# Patient Record
Sex: MALE | Race: WHITE | NOT HISPANIC OR LATINO | Employment: FULL TIME | URBAN - NONMETROPOLITAN AREA
[De-identification: names, ages, dates, MRNs, and addresses within clinical notes are randomized per-mention and may not be internally consistent; named-entity substitution may affect disease eponyms.]

---

## 2017-12-15 ENCOUNTER — HOSPITAL ENCOUNTER (EMERGENCY)
Facility: HOSPITAL | Age: 37
Discharge: HOME | End: 2017-12-15
Admitting: EMERGENCY MEDICINE

## 2017-12-15 ENCOUNTER — APPOINTMENT (EMERGENCY)
Dept: RADIOLOGY | Facility: HOSPITAL | Age: 37
End: 2017-12-15

## 2017-12-15 VITALS
HEART RATE: 98 BPM | HEIGHT: 74 IN | SYSTOLIC BLOOD PRESSURE: 135 MMHG | TEMPERATURE: 99.2 F | DIASTOLIC BLOOD PRESSURE: 93 MMHG | WEIGHT: 250 LBS | BODY MASS INDEX: 32.08 KG/M2 | RESPIRATION RATE: 18 BRPM | OXYGEN SATURATION: 98 %

## 2017-12-15 DIAGNOSIS — G89.29 CHRONIC SCAPULAR PAIN: Primary | ICD-10-CM

## 2017-12-15 DIAGNOSIS — M89.8X1 CHRONIC SCAPULAR PAIN: Primary | ICD-10-CM

## 2017-12-15 PROCEDURE — 99283 EMERGENCY DEPT VISIT LOW MDM: CPT

## 2017-12-15 PROCEDURE — 72050 X-RAY EXAM NECK SPINE 4/5VWS: CPT

## 2017-12-15 PROCEDURE — 72072 X-RAY EXAM THORAC SPINE 3VWS: CPT

## 2017-12-15 RX ORDER — BUPIVACAINE HYDROCHLORIDE 5 MG/ML
10 INJECTION, SOLUTION EPIDURAL; INTRACAUDAL ONCE
Status: COMPLETED | OUTPATIENT
Start: 2017-12-15 | End: 2017-12-15

## 2017-12-15 RX ORDER — LIDOCAINE 50 MG/G
1 PATCH TOPICAL DAILY
Qty: 30 PATCH | Refills: 0 | Status: SHIPPED | OUTPATIENT
Start: 2017-12-15 | End: 2018-08-27

## 2017-12-15 RX ORDER — CYCLOBENZAPRINE HCL 10 MG
10 TABLET ORAL 3 TIMES DAILY PRN
Qty: 30 TABLET | Refills: 0 | Status: SHIPPED | OUTPATIENT
Start: 2017-12-15 | End: 2018-08-27

## 2017-12-15 RX ORDER — IBUPROFEN 200 MG
400 TABLET ORAL EVERY 6 HOURS PRN
COMMUNITY

## 2017-12-15 RX ADMIN — BUPIVACAINE HYDROCHLORIDE 10 ML: 5 INJECTION, SOLUTION EPIDURAL; INTRACAUDAL at 18:29

## 2017-12-15 NOTE — ED PROVIDER NOTES
History  Chief Complaint   Patient presents with    Shoulder Pain     left shoulder blade pain for months  Became worse 1 month ago  Patient presents to the emergency department today for longstanding left scapular pain  Patient states he has been ongoing for 4 months  He denies chest pain or shortness of breath  Patient states he has seen a chiropracty, orthopedics  He has had plain films of the shoulder as well as an MRI of the shoulder which did not show any abnormalities  Has not undergone physical therapy  He does not believe he has had any imaging of the back  Prior to Admission Medications   Prescriptions Last Dose Informant Patient Reported? Taking?   ibuprofen (MOTRIN) 200 mg tablet   Yes Yes   Sig: Take by mouth every 6 (six) hours as needed for mild pain      Facility-Administered Medications: None       Past Medical History:   Diagnosis Date    Collapsed lung 8 years ago    chest tube placement       History reviewed  No pertinent surgical history  History reviewed  No pertinent family history  I have reviewed and agree with the history as documented  Social History   Substance Use Topics    Smoking status: Current Every Day Smoker     Packs/day: 1 00     Types: Cigarettes    Smokeless tobacco: Never Used    Alcohol use No        Review of Systems   Constitutional: Negative  HENT: Negative  Eyes: Negative  Respiratory: Negative  Cardiovascular: Negative  Gastrointestinal: Negative  Endocrine: Negative  Genitourinary: Negative  Musculoskeletal: Positive for back pain  Negative for arthralgias, gait problem, joint swelling, neck pain and neck stiffness  L medial scapular tenderness   Skin: Negative  Allergic/Immunologic: Negative  Neurological: Negative  Hematological: Negative  Psychiatric/Behavioral: Negative  All other systems reviewed and are negative        Physical Exam  ED Triage Vitals   Temperature Pulse Respirations Blood Pressure SpO2   12/15/17 1656 12/15/17 1658 12/15/17 1658 12/15/17 1658 12/15/17 1658   99 2 °F (37 3 °C) 98 18 135/93 98 %      Temp Source Heart Rate Source Patient Position - Orthostatic VS BP Location FiO2 (%)   12/15/17 1656 12/15/17 1658 12/15/17 1658 12/15/17 1658 --   Temporal Monitor Sitting Right arm       Pain Score       12/15/17 1656       8           Orthostatic Vital Signs  Vitals:    12/15/17 1658   BP: 135/93   Pulse: 98   Patient Position - Orthostatic VS: Sitting       Physical Exam   Constitutional: He is oriented to person, place, and time  He appears well-developed and well-nourished  No distress  HENT:   Head: Normocephalic and atraumatic  Eyes: Pupils are equal, round, and reactive to light  Neck: Normal range of motion  Cardiovascular: Normal rate  Pulmonary/Chest: Effort normal    Abdominal: Soft  Musculoskeletal: He exhibits tenderness  He exhibits no edema or deformity  Left medial scapular point tenderness  No rash/swelling  Neurological: He is alert and oriented to person, place, and time  Skin: Capillary refill takes less than 2 seconds  He is not diaphoretic  Psychiatric: He has a normal mood and affect  Vitals reviewed  ED Medications  Medications   bupivacaine (PF) (MARCAINE) 0 5 % injection 10 mL (10 mL Injection Given by Other 12/15/17 1829)       Diagnostic Studies  Results Reviewed     None                 XR spine thoracic 3 views   Final Result by Romel Moreno MD (12/15 1915)      Minimal anterior vertebral body height loss at T11  Unless there is midline point tenderness near the thoracolumbar junction, height loss is likely chronic and discogenic  No other acute osseous findings  Workstation performed: [id number]         XR spine cervical complete 4 or 5 vw non injury   Final Result by Romel Moreno MD (12/15 1912)      1  Straightening of the cervical lordosis may suggest muscle spasm        2   No acute findings or significant degenerative changes         Workstation performed: [id number]                    Procedures  Procedures       Phone Contacts  ED Phone Contact    ED Course  ED Course as of Dec 15 1934   Fri Dec 15, 2017   1833 10 cc of 0 5% bupivacaine was injected into the point of maximal tenderness just medial to the scapula left side  Area was aspirated and no evidence of venous puncture was noticed  Patient tolerated the procedure well  Skin was prepped both pre and post procedure and dressing placed    1844 Patient was re-evaluated just prior to discharge  Patient states he is experiencing much relief from the injection                                [facility]  CritCare Time    Disposition  Final diagnoses:   Chronic scapular pain     Time reflects when diagnosis was documented in both MDM as applicable and the Disposition within this note     Time User Action Codes Description Comment    12/15/2017  6:34 PM Roxana Hair Add [R31 0X5,  G89 29] Chronic scapular pain       ED Disposition     ED Disposition Condition Comment    Discharge  Kye Riojas discharge to home/self care      Condition at discharge: Good        Follow-up Information     Follow up With Specialties Details Why Apolinar Cardoza DO Anesthesiology, Pain Medicine Schedule an appointment as soon as possible for a visit  [address] [address]  [phone]          Discharge Medication List as of 12/15/2017  6:39 PM      START taking these medications    Details   cyclobenzaprine (FLEXERIL) 10 mg tablet Take 1 tablet by mouth 3 (three) times a day as needed for muscle spasms, Starting Fri 12/15/2017, Print      lidocaine (LIDODERM) 5 % Place 1 patch on the skin daily Remove & Discard patch within 12 hours or as directed by MD, Starting Fri 12/15/2017, Print         CONTINUE these medications which have NOT CHANGED    Details   ibuprofen (MOTRIN) 200 mg tablet Take by mouth every 6 (six) hours as needed for mild pain, Historical Med           No discharge procedures on file      ED Provider  Electronically Signed by           Sapphire Gastelum PA-C  12/15/17 [place] [address] Aubree Bobby PA-C  12/15/17 1930

## 2017-12-15 NOTE — DISCHARGE INSTRUCTIONS
Chronic Pain   WHAT YOU NEED TO KNOW:   Chronic pain is pain that does not get better for 3 months or longer  Chronic pain may hurt all the time, or come and go  DISCHARGE INSTRUCTIONS:   Call 911 or have someone call 911 for any of the following:   · You are breathing slower than normal, or you have trouble breathing  · You cannot be awakened  · You have a seizure  Seek care immediately if:   · Your heart is beating slower than normal     · Your heart feels like it is jumping or fluttering  · You cannot think clearly  Contact your healthcare provider if:   · You have side effects from prescription pain medicine, such as itching, nausea, or vomiting  · You have trouble sleeping  · Your pain gets worse, even after you take medicine  · You don't think the medicine is working  · You have questions or concerns about your condition or care  Medicines: You may need any of the following:  · Acetaminophen  decreases pain  It is available without a doctor's order  Ask how much to take and how often to take it  Follow directions  Read the labels of all other medicines you are using to see if they also contain acetaminophen, or ask your doctor or pharmacist  Acetaminophen can cause liver damage if not taken correctly  Do not use more than 4 grams (4,000 milligrams) total of acetaminophen in one day  · NSAIDs , such as ibuprofen, help decrease swelling, pain, and fever  This medicine is available with or without a doctor's order  NSAIDs can cause stomach bleeding or kidney problems in certain people  If you take blood thinner medicine, always ask your healthcare provider if NSAIDs are safe for you  Always read the medicine label and follow directions  · Prescription pain medicine  called narcotics or opioids may be given  Ask your healthcare provider how to take this medicine safely  · Anesthetics  can be rubbed on your skin or injected into a nerve or muscle to numb an area      · Other medicines  may reduce pain, anxiety, muscle tension, or swelling  · Take your medicine as directed  Contact your healthcare provider if you think your medicine is not helping or if you have side effects  Tell him of her if you are allergic to any medicine  Keep a list of the medicines, vitamins, and herbs you take  Include the amounts, and when and why you take them  Bring the list or the pill bottles to follow-up visits  Carry your medicine list with you in case of an emergency  Manage your chronic pain:   · Apply heat  on the area in pain for 20 to 30 minutes every 2 hours for as many days as directed  Heat helps decrease pain and muscle spasms  · Apply ice  on the part of your body that hurts for 15 to 20 minutes every hour or as directed  Use an ice pack, or put crushed ice in a plastic bag  Cover it with a towel  Ice decreases pain and swelling, and helps prevent tissue damage  · Go to physical therapy as directed  A physical therapist teaches you exercises to help improve movement and strength, and to decrease pain  · Exercise for 30 minutes, 3 times a week  Regular physical activity can help decrease pain and improve your quality of life  Ask your healthcare provider about the best exercise plan for your type of pain  · Get enough sleep  Create a relaxing bedtime routine  Go to sleep and wake up at the same time every day  Avoid caffeine in the afternoon  · Talk with a counselor or therapist   A type of counseling called cognitive behavioral therapy (CBT) can help your chronic pain by changing the way you think about it  CBT can also improve your mood, sleep, and ability to move  What you must know if you take narcotic pain medicine:   · You may need to take a bowel movement softener  The most common side effect of prescription pain medicine is constipation  Bowel movement softeners are available over the counter  · Do not mix prescription pain medicines    This can cause an overdose of medicine, which can become life-threatening  Read labels  Make sure you know the ingredients in all of your medicines  · Do not drink alcohol  when you take prescription pain medicine  It is not safe to mix narcotics or opioids with alcohol or illegal drugs  · Prescription pain medicine may impair your ability to drive or work safely  They may also cause dizziness and increase your risk for falling  · Store prescription pain medicine in a safe location at home  Keep your medicine away from children and other people  Never share your medicine with anyone  Follow up with your healthcare provider as directed: You may be referred to a pain specialist  Write down your questions so you remember to ask them during your visits  © 2017 [address] Information is for End User's use only and may not be sold, redistributed or otherwise used for commercial purposes  All illustrations and images included in CareNotes® are the copyrighted property of [employer]  or [address]  The above information is an  only  It is not intended as medical advice for individual conditions or treatments  Talk to your doctor, nurse or pharmacist before following any medical regimen to see if it is safe and effective for you

## 2018-08-27 ENCOUNTER — HOSPITAL ENCOUNTER (EMERGENCY)
Facility: HOSPITAL | Age: 38
Discharge: LEFT BEFORE DISCHARGE | End: 2018-08-27
Attending: FAMILY MEDICINE | Admitting: FAMILY MEDICINE

## 2018-08-27 VITALS
BODY MASS INDEX: 29.65 KG/M2 | DIASTOLIC BLOOD PRESSURE: 89 MMHG | HEIGHT: 74 IN | HEART RATE: 100 BPM | RESPIRATION RATE: 16 BRPM | SYSTOLIC BLOOD PRESSURE: 138 MMHG | TEMPERATURE: 98.5 F | WEIGHT: 231 LBS | OXYGEN SATURATION: 98 %

## 2018-08-27 DIAGNOSIS — M25.522 LEFT ELBOW PAIN: Primary | ICD-10-CM

## 2018-08-27 PROCEDURE — 99283 EMERGENCY DEPT VISIT LOW MDM: CPT

## 2018-08-27 RX ORDER — KETOROLAC TROMETHAMINE 30 MG/ML
60 INJECTION, SOLUTION INTRAMUSCULAR; INTRAVENOUS ONCE
Status: DISCONTINUED | OUTPATIENT
Start: 2018-08-27 | End: 2018-08-27 | Stop reason: HOSPADM

## 2018-08-27 RX ORDER — METHYLPREDNISOLONE 4 MG/1
TABLET ORAL
Qty: 21 TABLET | Refills: 0 | Status: SHIPPED | OUTPATIENT
Start: 2018-08-27

## 2018-08-27 RX ORDER — IBUPROFEN 800 MG/1
800 TABLET ORAL EVERY 8 HOURS PRN
Qty: 21 TABLET | Refills: 0 | Status: SHIPPED | OUTPATIENT
Start: 2018-08-27

## 2018-08-27 NOTE — DISCHARGE INSTRUCTIONS
Arm Pain   WHAT YOU NEED TO KNOW:   Your arm pain may be caused by a number of conditions  Examples include arthritis, nerve problems, or an awkward position while you sleep  X-rays did not show a broken bone in your arm or wrist  Arm pain may be a sign of a serious condition that needs immediate care, such as a heart attack  DISCHARGE INSTRUCTIONS:   Call 911 for any of the following: You have any of the following signs of a heart attack:   · Squeezing, pressure, or pain in your chest that lasts longer than 5 minutes or returns    · Discomfort or pain in your back, neck, jaw, stomach, or arm     · Trouble breathing or a fast, fluttery heartbeat    · Nausea or vomiting    · Lightheadedness or a sudden cold sweat, especially with chest pain or trouble breathing  Return to the emergency department if:   · You have severe pain, or pain that spreads from your arm to other areas  · You have swelling, tingling, or numbness in your hand or fingers, or the skin turns blue  · You cannot move your arm  Contact your healthcare provider if:   · You have questions or concerns about your condition or care  Medicines: You may need any of the following:  · Prescription pain medicine  may be given  Ask how to take this medicine safely  · NSAIDs , such as ibuprofen, help decrease swelling, pain, and fever  This medicine is available with or without a doctor's order  NSAIDs can cause stomach bleeding or kidney problems in certain people  If you take blood thinner medicine, always ask your healthcare provider if NSAIDs are safe for you  Always read the medicine label and follow directions  · Take your medicine as directed  Contact your healthcare provider if you think your medicine is not helping or if you have side effects  Tell him or her if you are allergic to any medicine  Keep a list of the medicines, vitamins, and herbs you take  Include the amounts, and when and why you take them   Bring the list or the pill bottles to follow-up visits  Carry your medicine list with you in case of an emergency  Self-care:   · Rest your arm as directed  A sling may be used to keep your arm from moving while it heals  · Apply ice as directed  Ice helps decrease pain and swelling  Ice may also help prevent tissue damage  Use an ice pack, or put crushed ice in a plastic bag  Cover it with a towel  Apply it to your arm for 20 minutes every few hours, or as directed  Ask how many times to apply ice each day, and for how many days  · Elevate your arm above the level of your heart as often as you can  This will help decrease swelling and pain  Prop your arm on pillows or blankets to keep the area elevated comfortably  · Adjust your position if you work in front of a computer  You may need arm or wrist supports or change the height of your chair  · Keep a pain record  Write down when your pain happens and how severe it is  Include any other symptoms you have with your pain  A record will help you keep track of pain cycles  Bring the record with you to your follow-up visits  It may also help your healthcare provider find out what is causing your pain  Follow up with your healthcare provider as directed: You may need physical therapy  You may need to see an orthopedic specialist  Write down your questions so you remember to ask them during your visits  © 2017 [address] Information is for End User's use only and may not be sold, redistributed or otherwise used for commercial purposes  All illustrations and images included in CareNotes® are the copyrighted property of [employer]  or [employer]  The above information is an  only  It is not intended as medical advice for individual conditions or treatments  Talk to your doctor, nurse or pharmacist before following any medical regimen to see if it is safe and effective for you

## 2018-08-27 NOTE — ED PROVIDER NOTES
History  Chief Complaint   Patient presents with    Elbow Pain     left; denies injury or trauma       History provided by:  Patient   used: No    Elbow Pain   Location:  Elbow  Elbow location:  L elbow  Injury: no    Pain details:     Quality:  Sharp    Radiates to:  L forearm    Severity:  Moderate    Onset quality:  Gradual    Duration:  3 days    Timing:  Constant    Progression:  Waxing and waning  Handedness:  Right-handed  Dislocation: no    Foreign body present:  No foreign bodies  Tetanus status:  Up to date  Prior injury to area:  No  Relieved by:  None tried  Worsened by: Movement  Ineffective treatments:  None tried  Associated symptoms: numbness    Associated symptoms: no back pain, no decreased range of motion, no fatigue, no fever, no muscle weakness, no neck pain, no swelling and no tingling    Risk factors: no recent illness        Prior to Admission Medications   Prescriptions Last Dose Informant Patient Reported? Taking?   ibuprofen (MOTRIN) 200 mg tablet   Yes No   Sig: Take 400 mg by mouth every 6 (six) hours as needed for mild pain        Facility-Administered Medications: None       Past Medical History:   Diagnosis Date    Collapsed lung 8 years ago    chest tube placement    Collapsed lung     Renal disorder        History reviewed  No pertinent surgical history  History reviewed  No pertinent family history  I have reviewed and agree with the history as documented  Social History   Substance Use Topics    Smoking status: Current Every Day Smoker     Packs/day: 1 00     Types: Cigarettes    Smokeless tobacco: Never Used    Alcohol use Yes      Comment: occasional        Review of Systems   Constitutional: Negative for chills, fatigue and fever  HENT: Negative for rhinorrhea and sore throat  Eyes: Negative for visual disturbance  Respiratory: Negative for cough and shortness of breath  Cardiovascular: Negative for chest pain and leg swelling  Gastrointestinal: Negative for abdominal pain, diarrhea, nausea and vomiting  Genitourinary: Negative for dysuria  Musculoskeletal: Negative for back pain, myalgias and neck pain  Left elbow pain    Skin: Negative for rash  Neurological: Negative for dizziness and headaches  Psychiatric/Behavioral: Negative for confusion  All other systems reviewed and are negative  Physical Exam  Physical Exam   Constitutional: He is oriented to person, place, and time  He appears well-developed and well-nourished  No distress  HENT:   Head: Normocephalic and atraumatic  Neck: Normal range of motion  Neck supple  Cardiovascular: Normal rate and regular rhythm  Pulmonary/Chest: Effort normal and breath sounds normal  No respiratory distress  He has no wheezes  Abdominal: He exhibits no distension  There is no tenderness  Musculoskeletal: He exhibits tenderness (Left elbow tenderness range of motion is intact no redness or swelling was noted  Good sensation radial pulses palpable)  Lymphadenopathy:     He has no cervical adenopathy  Neurological: He is alert and oriented to person, place, and time  Skin: Skin is warm and dry  Capillary refill takes less than 2 seconds  He is not diaphoretic  Psychiatric: He has a normal mood and affect  His behavior is normal    Nursing note and vitals reviewed        Vital Signs  ED Triage Vitals [08/27/18 1800]   Temperature Pulse Respirations Blood Pressure SpO2   98 5 °F (36 9 °C) 100 16 138/89 98 %      Temp Source Heart Rate Source Patient Position - Orthostatic VS BP Location FiO2 (%)   Temporal Monitor Sitting Right arm --      Pain Score       8           Vitals:    08/27/18 1800   BP: 138/89   Pulse: 100   Patient Position - Orthostatic VS: Sitting       Visual Acuity      ED Medications  Medications   ketorolac (TORADOL) injection 60 mg (not administered)   dexamethasone (DECADRON) injection 10 mg (not administered)       Diagnostic Studies  Results Reviewed     None                 No orders to display              Procedures  Procedures       Phone Contacts  ED Phone Contact    ED Course                 1830 Pt eloped              MDM  CritCare Time    Disposition  Final diagnoses:   Left elbow pain     Time reflects when diagnosis was documented in both MDM as applicable and the Disposition within this note     Time User Action Codes Description Comment    8/27/2018  6:08 PM Octavio Morrison [M25 522] Left elbow pain       ED Disposition     None      Follow-up Information     Follow up With Specialties Details Why Contact Info    Clair Price PA-C Family Medicine, Physician Assistant In 2 days If symptoms worsen [address] [place] [address]  [address] [phone]            Patient's Medications   Discharge Prescriptions    IBUPROFEN (MOTRIN) 800 MG TABLET    Take 1 tablet (800 mg total) by mouth every 8 (eight) hours as needed for mild pain       Start Date: 8/27/2018 End Date: --       Order Dose: 800 mg       Quantity: 21 tablet    Refills: 0    METHYLPREDNISOLONE 4 MG TBPK    Use as directed on package       Start Date: 8/27/2018 End Date: --       Order Dose: --       Quantity: 21 tablet    Refills: 0     No discharge procedures on file      ED Provider  Electronically Signed by           Lo Chan MD  08/27/18 6504 David Key MD  08/27/18 2844

## 2018-08-27 NOTE — ED NOTES
Pt not in treatment room when this RN entered room  Pt labels found laying on bed  Per ER registration staff they witnessed pt exit through ER waiting area approximately 5-10 minutes ago       Morteza Wilson RN  08/27/18 9026

## 2019-09-11 ENCOUNTER — TELEPHONE (OUTPATIENT)
Dept: FAMILY MEDICINE CLINIC | Facility: CLINIC | Age: 39
End: 2019-09-11

## 2020-02-03 NOTE — TELEPHONE ENCOUNTER
02/03/20 3:08 PM     Thank you for your request  Your request has been received, reviewed, and the patient chart updated  The PCP has successfully been removed with a patient attribution note  This message will now be completed      Thank you  Christopher Jones

## 2021-10-07 ENCOUNTER — HOSPITAL ENCOUNTER (EMERGENCY)
Facility: HOSPITAL | Age: 41
Discharge: HOME | End: 2021-10-07
Attending: EMERGENCY MEDICINE | Admitting: EMERGENCY MEDICINE

## 2021-10-07 VITALS
TEMPERATURE: 98.2 F | HEIGHT: 74 IN | OXYGEN SATURATION: 98 % | RESPIRATION RATE: 20 BRPM | DIASTOLIC BLOOD PRESSURE: 95 MMHG | BODY MASS INDEX: 29.65 KG/M2 | HEART RATE: 90 BPM | WEIGHT: 231 LBS | SYSTOLIC BLOOD PRESSURE: 150 MMHG

## 2021-10-07 DIAGNOSIS — H66.90 OTITIS MEDIA: Primary | ICD-10-CM

## 2021-10-07 PROCEDURE — 99284 EMERGENCY DEPT VISIT MOD MDM: CPT | Performed by: EMERGENCY MEDICINE

## 2021-10-07 PROCEDURE — 99282 EMERGENCY DEPT VISIT SF MDM: CPT

## 2021-10-07 RX ORDER — AMOXICILLIN 500 MG/1
500 CAPSULE ORAL 3 TIMES DAILY
Qty: 30 CAPSULE | Refills: 0 | Status: SHIPPED | OUTPATIENT
Start: 2021-10-07 | End: 2021-10-17

## 2021-10-07 RX ORDER — AMOXICILLIN 500 MG/1
500 CAPSULE ORAL ONCE
Status: COMPLETED | OUTPATIENT
Start: 2021-10-07 | End: 2021-10-07

## 2021-10-07 RX ADMIN — AMOXICILLIN 500 MG: 500 CAPSULE ORAL at 16:58

## 2022-01-28 ENCOUNTER — OFFICE VISIT (OUTPATIENT)
Dept: URGENT CARE | Facility: CLINIC | Age: 42
End: 2022-01-28

## 2022-01-28 VITALS — TEMPERATURE: 98.1 F | OXYGEN SATURATION: 98 % | HEART RATE: 88 BPM | RESPIRATION RATE: 20 BRPM

## 2022-01-28 DIAGNOSIS — J20.8 ACUTE BRONCHITIS DUE TO OTHER SPECIFIED ORGANISMS: Primary | ICD-10-CM

## 2022-01-28 DIAGNOSIS — R05.1 ACUTE COUGH: ICD-10-CM

## 2022-01-28 DIAGNOSIS — Z72.0 TOBACCO USE: ICD-10-CM

## 2022-01-28 PROCEDURE — U0003 INFECTIOUS AGENT DETECTION BY NUCLEIC ACID (DNA OR RNA); SEVERE ACUTE RESPIRATORY SYNDROME CORONAVIRUS 2 (SARS-COV-2) (CORONAVIRUS DISEASE [COVID-19]), AMPLIFIED PROBE TECHNIQUE, MAKING USE OF HIGH THROUGHPUT TECHNOLOGIES AS DESCRIBED BY CMS-2020-01-R: HCPCS | Performed by: NURSE PRACTITIONER

## 2022-01-28 PROCEDURE — 99213 OFFICE O/P EST LOW 20 MIN: CPT | Performed by: NURSE PRACTITIONER

## 2022-01-28 PROCEDURE — U0005 INFEC AGEN DETEC AMPLI PROBE: HCPCS | Performed by: NURSE PRACTITIONER

## 2022-01-28 RX ORDER — AZITHROMYCIN 250 MG/1
TABLET, FILM COATED ORAL
Qty: 6 TABLET | Refills: 0 | Status: SHIPPED | OUTPATIENT
Start: 2022-01-28 | End: 2022-02-01

## 2022-01-28 NOTE — LETTER
January 28, 2022     Patient: Colt Jacobson   YOB: 1980   Date of Visit: 1/28/2022       To Whom It May Concern: It is my medical opinion that Steffany Trejo may return to work on 1/31/2022  If you have any questions or concerns, please don't hesitate to call  Sincerely,        JESSE Kraus    CC: Chris Bryson

## 2022-01-28 NOTE — PATIENT INSTRUCTIONS
You hare to take the azithromycin as prescribed  Take robitussin for cough  You are to stop smoking  You have a covid test pending  You are to download [employer] for the results in 24-72 hours, you will be notified if testing +  You are to follow up with a PCP  Go to the ED if symptoms worsen    Acute Bronchitis   WHAT YOU NEED TO KNOW:   Acute bronchitis is swelling and irritation in your lungs  It is usually caused by a virus and most often happens in the winter  Bronchitis may also be caused by bacteria or by a chemical irritant, such as smoke  DISCHARGE INSTRUCTIONS:   Return to the emergency department if:   · You cough up blood  · Your lips or fingernails turn blue  · You feel like you are not getting enough air when you breathe  Call your doctor if:   · Your symptoms do not go away or get worse, even after treatment  · Your cough does not get better within 4 weeks  · You have questions or concerns about your condition or care  Medicines: You may  need any of the following:  · Cough suppressants  decrease your urge to cough  · Decongestants  help loosen mucus in your lungs and make it easier to cough up  This can help you breathe easier  · Inhalers  may be given  Your healthcare provider may give you one or more inhalers to help you breathe easier and cough less  An inhaler gives your medicine to open your airways  Ask your healthcare provider to show you how to use your inhaler correctly  · Antibiotics  may be given for up to 5 days if your bronchitis is caused by bacteria  · Acetaminophen  decreases pain and fever  It is available without a doctor's order  Ask how much to take and how often to take it  Follow directions  Read the labels of all other medicines you are using to see if they also contain acetaminophen, or ask your doctor or pharmacist  Acetaminophen can cause liver damage if not taken correctly   Do not use more than 4 grams (4,000 milligrams) total of acetaminophen in one day  · NSAIDs  help decrease swelling and pain or fever  This medicine is available with or without a doctor's order  NSAIDs can cause stomach bleeding or kidney problems in certain people  If you take blood thinner medicine, always ask your healthcare provider if NSAIDs are safe for you  Always read the medicine label and follow directions  · Take your medicine as directed  Contact your healthcare provider if you think your medicine is not helping or if you have side effects  Tell him of her if you are allergic to any medicine  Keep a list of the medicines, vitamins, and herbs you take  Include the amounts, and when and why you take them  Bring the list or the pill bottles to follow-up visits  Carry your medicine list with you in case of an emergency  Self-care:   · Drink liquids as directed  You may need to drink more liquids than usual to stay hydrated  Ask how much liquid to drink each day and which liquids are best for you  · Use a cool mist humidifier  to increase air moisture in your home  This may make it easier for you to breathe and help decrease your cough  · Get more rest   Rest helps your body to heal  Slowly start to do more each day  Rest when you feel it is needed  · Avoid irritants in the air  Avoid chemicals, fumes, and dust  Wear a face mask if you must work around dust or fumes  Stay inside on days when air pollution levels are high  If you have allergies, stay inside when pollen counts are high  Do not use aerosol products, such as spray-on deodorant, bug spray, and hair spray  · Do not smoke or be around others who are smoking  Nicotine and other chemicals in cigarettes and cigars can cause lung damage  Ask your healthcare provider for information if you currently smoke and need help to quit  E-cigarettes or smokeless tobacco still contain nicotine  Talk to your healthcare provider before you use these products      Prevent acute bronchitis: · Ask about vaccines you may need  Get a flu vaccine each year as soon as recommended, usually in September or October  Ask your healthcare provider if you should also get a pneumonia or COVID-19 vaccine  Your healthcare provider can tell you if you should also get other vaccines, and when to get them  · Prevent the spread of germs  You can decrease your risk for acute bronchitis and other illnesses by doing the following:     ? Wash your hands often with soap and water  Carry germ-killing hand lotion or gel with you  You can use the lotion or gel to clean your hands when soap and water are not available  ? Do not touch your eyes, nose, or mouth unless you have washed your hands first     ? Always cover your mouth when you cough to prevent the spread of germs  It is best to cough into a tissue or your shirt sleeve instead of into your hand  Ask those around you to cover their mouths when they cough  ? Try to avoid people who have a cold or the flu  If you are sick, stay away from others as much as possible  Follow up with your doctor as directed:  Write down questions you have so you will remember to ask them during your follow-up visits  © Copyright [employer] 2021 Information is for End User's use only and may not be sold, redistributed or otherwise used for commercial purposes  All illustrations and images included in CareNotes® are the copyrighted property of [employer] M [employer]  or [employer]  The above information is an  only  It is not intended as medical advice for individual conditions or treatments  Talk to your doctor, nurse or pharmacist before following any medical regimen to see if it is safe and effective for you  COVID-19 (Coronavirus Disease 2019)   WHAT YOU NEED TO KNOW:   Coronavirus disease 2019 (COVID-19) is the disease caused by a coronavirus first discovered in December 2019   Coronaviruses generally cause upper respiratory (nose, throat, and lung) infections, such as a cold  The new virus spreads quickly and easily  The virus can be spread starting 2 days before symptoms even begin  The virus has also changed into several new forms (called variants) since it was discovered  The variants may be more contagious (easily spread) than the original form  Some may also cause more severe illness than others  It is important to follow local, national, and worldwide measures to protect yourself and others from infection  DISCHARGE INSTRUCTIONS:   If you think you or someone you know may be infected:  Do the following to protect others:  · If emergency care is needed,  tell the  about the possible infection, or call ahead and tell the emergency department  · Call a healthcare provider  for instructions if symptoms are mild  Anyone who may be infected should not  arrive without calling first  The provider will need to protect staff members and other patients  · The person who may be infected needs to wear a face covering  while getting medical care  This will help lower the risk of infecting others  Coverings are not used for anyone who is younger than 2 years, has breathing problems, or cannot remove it  The provider can give you instructions for anyone who cannot wear a covering  Call your local emergency number (911 in the [address] [place] [address],[address]) or an emergency department if:   · You have trouble breathing or shortness of breath at rest     · You have chest pain or pressure that lasts longer than 5 minutes  · You become confused or hard to wake  · Your lips or face are blue  · You have a fever of 104°F (40°C) or higher  Call your doctor if:   · You do not  have symptoms of COVID-19 but had close physical contact within 14 days with someone who tested positive  · You have questions or concerns about your condition or care  Medicines:   You may need any of the following for mild symptoms:  · Decongestants  help reduce nasal congestion and help you breathe more easily  If you take decongestant pills, they may make you feel restless or cause problems with your sleep  Do not use decongestant sprays for more than a few days  · Cough suppressants  help reduce coughing  Ask your healthcare provider which type of cough medicine is best for you  · Acetaminophen  decreases pain and fever  It is available without a doctor's order  Ask how much to take and how often to take it  Follow directions  Read the labels of all other medicines you are using to see if they also contain acetaminophen, or ask your doctor or pharmacist  Acetaminophen can cause liver damage if not taken correctly  Do not use more than 4 grams (4,000 milligrams) total of acetaminophen in one day  · NSAIDs , such as ibuprofen, help decrease swelling, pain, and fever  NSAIDs can cause stomach bleeding or kidney problems in certain people  If you take blood thinner medicine, always ask your healthcare provider if NSAIDs are safe for you  Always read the medicine label and follow directions  · Take your medicine as directed  Contact your healthcare provider if you think your medicine is not helping or if you have side effects  Tell him or her if you are allergic to any medicine  Keep a list of the medicines, vitamins, and herbs you take  Include the amounts, and when and why you take them  Bring the list or the pill bottles to follow-up visits  Carry your medicine list with you in case of an emergency  What you need to know about COVID-19 vaccines:  Get a vaccine even if you already had COVID-19  · COVID-19 vaccines are given as a shot in 1 or 2 doses  Some vaccines have emergency use authorization (EUA)  An EUA means the vaccine is not approved but is given because the benefits outweigh the risks  A 2-dose vaccine is fully approved for use in those 16 years or older  This vaccine also has an EUA for adolescents 12 to 15 years   Your healthcare provider can help you understand the benefits and risks  · A third dose is recommended for adults with a weakened immune system who get a 2-dose vaccine  The third dose is given at least 28 days after the second  · Even after you get the vaccine, continue social distancing and other measures  Experts are still learning how well the vaccines work to prevent infection, transmission, and severe illness  Although rare, you can become infected after you get the vaccine  You may also be able to pass the virus to others without knowing you are infected  · After you get the vaccine, check local, national, and international travel rules  Check to see if you need to be tested before you travel  You may also need to quarantine after you return  Some countries require proof of a negative test before you leave  You should also be tested 3 to 5 days after you return from another country  How the 2019 coronavirus spreads: The following are ways the virus is thought to spread, but more information may be coming:  · Droplets are the main way all coronaviruses spread  The virus travels in droplets that form when a person talks, coughs, or sneezes  The droplets can also float in the air for minutes or hours  Infection happens when you breathe in the droplets or get them in your eyes or nose  Close personal contact with an infected person increases your risk for infection  This means being within 6 feet (2 meters) of the person for at least 15 minutes over 24 hours  · Person-to-person contact can spread the virus  For example, a person with the virus on his or her hands can spread it by shaking hands with someone  · The virus can stay on objects and surfaces for a short time  You may become infected by touching the object or surface and then touching your eyes or mouth  · An infected animal may be able to infect a person who touches it  This may happen at live markets or on a farm      Help lower the risk for COVID-19:  The best way to prevent infection is to avoid anyone who is infected, but this can be hard to do  An infected person can spread the virus before signs or symptoms begin, or even if signs or symptoms never develop  The following can help lower the risk for infection:      · Wash your hands often throughout the day  Use soap and water  Rub your soapy hands together, lacing your fingers, for at least 20 seconds  Rinse with warm, running water  Dry your hands with a clean towel or paper towel  Use hand  that contains alcohol if soap and water are not available  Teach children how to wash their hands and use hand   · Cover sneezes and coughs  Turn your face away and cover your mouth and nose with a tissue  Throw the tissue away  Use the bend of your arm if a tissue is not available  Then wash your hands well with soap and water or use hand   Teach children how to cover a cough or sneeze  · Wear a face covering (mask) around anyone who does not live in your home  Use a cloth covering with at least 2 layers  You can also create layers by putting a cloth covering over a disposable non-medical mask  Cover your mouth and your nose  The covering should fit snugly against the bridge of your nose  Securely fasten it under your chin and on the sides of your face  Do not  wear a plastic face shield instead of a covering  Continue social distancing and washing your hands often  A face covering is not a substitute for social distancing safety measures  · Follow worldwide, national, and local social distancing guidelines  Keep at least 6 feet (2 meters) between you and others  Also keep this distance from anyone who comes to your home, such as someone making a delivery  Wear a face covering while you are around others  You will need to wear a covering in restaurants, stores, and other public buildings  You will also need a covering on mass transit, such as a bus, subway, or airplane   Remember to use a covering made from thick material or wear 2 coverings together  · Make a habit of not touching your face  If you get the virus on your hands, you can transfer it to your eyes, nose, or mouth and become infected  You can also transfer it to objects, surfaces, or people  Do not touch your eyes, nose, or mouth without washing your hands first     · Clean and disinfect high-touch surfaces and objects often  Use disinfecting wipes, or make a solution of 4 teaspoons of bleach in 1 quart (4 cups) of water  Clean and disinfect even if you think no one living in or coming to your home is infected with the virus  · Ask about other vaccines you may need  Get the influenza (flu) vaccine as soon as recommended each year, usually starting in September or October  Get the pneumonia vaccine if recommended  Your healthcare provider can tell you if you should also get other vaccines, and when to get them  Follow social distancing guidelines:  National and local social distancing rules vary  Rules may change over time as restrictions are lifted  Restrictions may return if an outbreak happens where you live  It is important to know and follow all current social distancing rules in your area  The following are general guidelines:  · Stay home if you are sick or think you may have COVID-19  It is important to stay home if you are waiting for a testing appointment or for test results  Even if you do not have symptoms, you can pass the virus to others  · Limit trips out of your home  Have food, medicines, and other supplies delivered and left at your door or other area, if possible  Plan trips out of your home so you make the fewest stops possible to limit close personal contact  Keep track of places you go  This will help contact tracers notify others if you become infected  · Avoid close physical contact with anyone who does not live in your home  Do not shake hands with, hug, or kiss a person as a greeting   If you must use public transportation (such as a bus or subway), try to sit or stand away from others  Only allow necessary people into your home  Wear your face covering, and remind others to wear a face covering  Remind them to wash their hands when they arrive and before they leave  Do not  let someone into your home or go to someone's home just to visit  Even if you both do not feel sick, the virus can pass from one of you to the other  · Avoid in-person gatherings and crowds  Gatherings or crowds of 10 or more individuals can cause the virus to spread  Avoid places such as [place], beaches, sporting events, and tourist attractions  For events such as parties, holiday meals, [employer] services, and conferences, attend virtually (on a computer), if possible  · Ask your healthcare provider for other ways to have appointments  Some providers offer phone, video, or other types of appointments  You may also be able to get prescriptions for a few months of your medicines at a time  · Stay safe if you must go out to work  Keep physical distance between you and other workers as much as possible  Follow your employer's rules so everyone stays safe  If you have COVID-19 and are recovering at home,  healthcare providers will give you specific instructions to follow  The following are general guidelines to remind you how to keep others safe until you are well:  · Wash your hands often  Use soap and water as much as possible  Use hand  that contains alcohol if soap and water are not available  Dry your hands with a clean towel or paper towel  Do not share towels with anyone  If you use paper towels, throw them away in a lined trash can kept in your room or area  Use a covered trash can, if possible  · Do not go out of your home unless it is necessary  Ask someone who is not infected to go out for groceries or supplies, or have them delivered   Do not go to your healthcare provider's office without an appointment  · Only have close physical contact with a person giving direct care, or a baby or child you must care for  Family members and friends should not visit you  If possible, stay in a separate area or room of your home if you live with others  No one should go into the area or room except to give you care  You can visit with others by phone, video chat, e-mail, or similar systems  · Wear a face covering while others are near you  This can help prevent droplets from spreading the virus when you talk, sneeze, or cough  Put the covering on before anyone comes into your room or area  Remind the person to cover his or her nose and mouth before coming in to provide care for you  · Do not share items  Do not share dishes, towels, or other items with anyone  Items need to be washed after you use them  · Protect your baby  Some newborns have tested positive for the virus  It is not known if they became infected before or after birth  The highest risk is when a  has close contact with an infected person  If you are pregnant or breastfeeding, talk to your healthcare provider or obstetrician about any concerns you have  He or she will tell you when to bring your baby in for check-ups and vaccines  He or she will also tell you what to do if you think your baby was infected with the coronavirus  Wash your hands and put on a clean face covering before you breastfeed or care for your baby  · Do not handle live animals unless it is necessary  Some animals, including pets, have been infected with the new coronavirus  Ask someone who is not infected to take care of your pet until you are well  If you must care for a pet, wear a face covering  Wash your hands before and after you give care  Talk to your healthcare provider about how to keep a service animal safe, if needed  · Follow directions from your healthcare provider for being around others after you recover    It is not known if or for how long a recovered person can pass the virus to others  Your provider may give you instructions, such as continuing social distancing and wearing a face covering  He or she will tell you when it is okay to be around others again  This may be 10 to 20 days after symptoms started or you had a positive test  Most symptoms will also need to be gone  Your provider will give you more information  Follow up with your doctor as directed:  Write down your questions so you remember to ask them during your visits  For more information:   · Centers for Disease Control and Prevention  [address] [place] , [address]  Phone: [phone]  Web Address: [employer]    © [facility] 2021 Information is for End User's use only and may not be sold, redistributed or otherwise used for commercial purposes  All illustrations and images included in CareNotes® are the copyrighted property of A D A M , Inc  or [address] [place], [place] [address]   The above information is an  only  It is not intended as medical advice for individual conditions or treatments  Talk to your doctor, nurse or pharmacist before following any medical regimen to see if it is safe and effective for you

## 2022-01-28 NOTE — PROGRESS NOTES
[facility]        NAME: Josem Bumpers is a 37 y o  male  : 1980    MRN: [id number]  DATE: 2022  TIME: 2:28 PM    Assessment and Plan   Acute bronchitis due to other specified organisms [J20 8]  1  Acute bronchitis due to other specified organisms  azithromycin (ZITHROMAX) 250 mg tablet   2  Acute cough  COVID Only -Office Collect    azithromycin (ZITHROMAX) 250 mg tablet   3  Tobacco use  azithromycin (ZITHROMAX) 250 mg tablet         Patient Instructions       Follow up with PCP in 3-5 days  Proceed to  ER if symptoms worsen  You hare to take the azithromycin as prescribed  Take robitussin for cough  You are to stop smoking  You have a covid test pending  You are to download [employer] for the results in 24-72 hours, you will be notified if testing +  You are to follow up with a PCP  Go to the ED if symptoms worsen          Chief Complaint     Chief Complaint   Patient presents with    Cough     x 5 days     Sore Throat     x 5 days          History of Present Illness       This is a 37year old male who states has been ill for 1 5 weeks with cough, nasal congestion, sorethroat  Denies covid vaccine and does work at [employer]  He states he has been taking OTC products with some relief  Denies fevers, chills, n/v/d  Review of Systems   Review of Systems   Constitutional: Negative  HENT: Positive for congestion and sore throat  Eyes: Negative  Respiratory: Positive for cough  Cardiovascular: Negative  Gastrointestinal: Negative  Endocrine: Negative  Genitourinary: Negative  Musculoskeletal: Negative  Skin: Negative  Allergic/Immunologic: Negative  Neurological: Negative  Hematological: Negative  Psychiatric/Behavioral: Negative            Current Medications       Current Outpatient Medications:     azithromycin (ZITHROMAX) 250 mg tablet, Take 2 tablets today then 1 tablet daily x 4 days, Disp: 6 tablet, Rfl: 0    ibuprofen (MOTRIN) 200 mg tablet, Take 400 mg by mouth every 6 (six) hours as needed for mild pain   (Patient not taking: Reported on 10/7/2021), Disp: , Rfl:     ibuprofen (MOTRIN) 800 mg tablet, Take 1 tablet (800 mg total) by mouth every 8 (eight) hours as needed for mild pain (Patient not taking: Reported on 10/7/2021), Disp: 21 tablet, Rfl: 0    Methylprednisolone 4 MG TBPK, Use as directed on package (Patient not taking: Reported on 10/7/2021), Disp: 21 tablet, Rfl: 0    Current Allergies     Allergies as of 01/28/2022    (No Known Allergies)            The following portions of the patient's history were reviewed and updated as appropriate: allergies, current medications, past family history, past medical history, past social history, past surgical history and problem list      Past Medical History:   Diagnosis Date    Collapsed lung 8 years ago    chest tube placement    Collapsed lung     Renal disorder        History reviewed  No pertinent surgical history  History reviewed  No pertinent family history  Medications have been verified  Objective   Pulse 88   Temp 98 1 °F (36 7 °C)   Resp 20   SpO2 98%   No LMP for male patient  Physical Exam     Physical Exam  Vitals and nursing note reviewed  Constitutional:       General: He is not in acute distress  Appearance: He is well-developed and normal weight  He is not ill-appearing, toxic-appearing or diaphoretic  Comments: Smells of tobacco    HENT:      Head: Normocephalic and atraumatic  Ears:      Comments: + cerumen impaction B/L      Nose: Congestion present  Mouth/Throat:      Mouth: Mucous membranes are moist       Pharynx: Posterior oropharyngeal erythema present  Eyes:      Pupils: Pupils are equal, round, and reactive to light  Cardiovascular:      Rate and Rhythm: Normal rate and regular rhythm  Heart sounds: Normal heart sounds     Pulmonary:      Effort: Pulmonary effort is normal       Comments: Decreased breath sounds through out   Abdominal:      General: Bowel sounds are normal       Palpations: Abdomen is soft  Musculoskeletal:      Cervical back: Normal range of motion  Skin:     General: Skin is warm and dry  Capillary Refill: Capillary refill takes less than 2 seconds  Neurological:      General: No focal deficit present  Mental Status: He is alert and oriented to person, place, and time     Psychiatric:         Mood and Affect: Mood normal          Behavior: Behavior normal

## 2022-01-29 LAB — SARS-COV-2 RNA RESP QL NAA+PROBE: NEGATIVE

## 2024-02-20 ENCOUNTER — OFFICE VISIT (OUTPATIENT)
Dept: URGENT CARE | Facility: MEDICAL CENTER | Age: 44
End: 2024-02-20

## 2024-02-20 VITALS
TEMPERATURE: 98 F | DIASTOLIC BLOOD PRESSURE: 90 MMHG | OXYGEN SATURATION: 97 % | HEART RATE: 115 BPM | BODY MASS INDEX: 24.31 KG/M2 | RESPIRATION RATE: 20 BRPM | WEIGHT: 189.4 LBS | SYSTOLIC BLOOD PRESSURE: 140 MMHG | HEIGHT: 74 IN

## 2024-02-20 DIAGNOSIS — R68.89 FLU-LIKE SYMPTOMS: Primary | ICD-10-CM

## 2024-02-20 LAB
SARS-COV-2 AG UPPER RESP QL IA: NEGATIVE
VALID CONTROL: NORMAL

## 2024-02-20 PROCEDURE — 99212 OFFICE O/P EST SF 10 MIN: CPT

## 2024-02-20 PROCEDURE — 87636 SARSCOV2 & INF A&B AMP PRB: CPT

## 2024-02-20 PROCEDURE — 87811 SARS-COV-2 COVID19 W/OPTIC: CPT

## 2024-02-20 NOTE — PROGRESS NOTES
[facility]        NAME: Marcus Barrera is a 43 y.o. male  : 1980    MRN: [id number]  DATE: 2024  TIME: 2:08 PM    Assessment and Plan   Flu-like symptoms [R68.89]  1. Flu-like symptoms  Poct Covid 19 Rapid Antigen Test    Covid/Flu- Office Collect Normal            Patient Instructions       Follow up with PCP in 3-5 days.  Proceed to  ER if symptoms worsen.    Chief Complaint     Chief Complaint   Patient presents with   • Cold Like Symptoms     Pt woke up yesterday with cold sweats, sore throat, headache, body aches. Nyquil taken last night for symptoms.          History of Present Illness       Patient here with flu like symptoms. He woke up yesterday with cold sweats, sore throat, headache, & body aches. He took nyquil last night for his symptoms. Cough is productive for green mucus. Having post nasal drip.         Review of Systems   Review of Systems   Constitutional:  Positive for chills and fever. Negative for fatigue.   HENT:  Positive for congestion, postnasal drip, rhinorrhea, sinus pressure and sinus pain. Negative for ear pain, sore throat and trouble swallowing.    Respiratory:  Positive for cough. Negative for shortness of breath.    Cardiovascular:  Negative for chest pain and palpitations.   Gastrointestinal:  Negative for abdominal pain, constipation, diarrhea, nausea and vomiting.   Musculoskeletal:  Negative for arthralgias and back pain.   Skin:  Negative for color change and rash.   Neurological:  Negative for dizziness, light-headedness and headaches.   All other systems reviewed and are negative.        Current Medications     No current outpatient medications on file.    Current Allergies     Allergies as of 2024   • (No Known Allergies)            The following portions of the patient's history were reviewed and updated as appropriate: allergies, current medications, past family history, past medical history, past social history, past surgical history  "and problem list.     Past Medical History:   Diagnosis Date   • Collapsed lung 8 years ago    chest tube placement   • Collapsed lung    • Renal disorder        History reviewed. No pertinent surgical history.    History reviewed. No pertinent family history.      Medications have been verified.        Objective   /90   Pulse (!) 115   Temp 98 °F (36.7 °C)   Resp 20   Ht 6' 2\" (1.88 m)   Wt 85.9 kg (189 lb 6.4 oz)   SpO2 97%   BMI 24.32 kg/m²        Physical Exam     Physical Exam  Vitals and nursing note reviewed.   Constitutional:       General: He is awake. He is not in acute distress.     Appearance: Normal appearance. He is well-developed, well-groomed and normal weight. He is not ill-appearing.   HENT:      Head: Normocephalic and atraumatic.      Right Ear: Ear canal and external ear normal. There is impacted cerumen.      Left Ear: Ear canal and external ear normal. There is impacted cerumen.      Ears:      Comments: TM obscured.     Patient reports history of ruptured TM in HS. Has since had issues with ears with drainage but has not had f/u with ENT.  Offered to attempt to clear some of the cerumen.  Patient declined.     Nose: Congestion and rhinorrhea present. Rhinorrhea is clear.      Right Sinus: No maxillary sinus tenderness or frontal sinus tenderness.      Left Sinus: No maxillary sinus tenderness or frontal sinus tenderness.      Mouth/Throat:      Lips: Pink.      Mouth: Mucous membranes are moist.      Pharynx: Oropharynx is clear. Uvula midline. Posterior oropharyngeal erythema present. No pharyngeal swelling, oropharyngeal exudate or uvula swelling.      Tonsils: No tonsillar exudate or tonsillar abscesses. 0 on the right. 0 on the left.   Eyes:      General: Lids are normal.      Extraocular Movements: Extraocular movements intact.      Conjunctiva/sclera: Conjunctivae normal.      Pupils: Pupils are equal, round, and reactive to light.   Cardiovascular:      Rate and Rhythm: " Normal rate and regular rhythm.      Pulses: Normal pulses.      Heart sounds: Normal heart sounds, S1 normal and S2 normal. No murmur heard.  Pulmonary:      Effort: Pulmonary effort is normal.      Breath sounds: Normal breath sounds.   Abdominal:      General: Abdomen is flat. Bowel sounds are normal.      Palpations: Abdomen is soft.   Musculoskeletal:         General: Normal range of motion.      Cervical back: Full passive range of motion without pain, normal range of motion and neck supple.   Lymphadenopathy:      Cervical: No cervical adenopathy.   Skin:     General: Skin is warm and dry.      Capillary Refill: Capillary refill takes less than 2 seconds.   Neurological:      General: No focal deficit present.      Mental Status: He is alert and oriented to person, place, and time.   Psychiatric:         Mood and Affect: Mood normal.         Behavior: Behavior normal. Behavior is cooperative.

## 2024-02-20 NOTE — LETTER
[address] [address]  No information on file.    February 20, 2024    Patient: Marcus Barrera  YOB: 1980    Marcus Barrera was seen and evaluated at our [facility]. Please note if Covid and Flu tests are negative, they may return to work when fever free for 24 hours without the use of a fever reducing agent. If Covid or Flu test is positive, they may return to work on 02/25/2024, as this is 5 days from the onset of symptoms. Upon return, they must then adhere to strict masking for an additional 5 days.    Sincerely,        JESSE Gunn  COVID/Flu pending 02/20/2024

## 2024-02-20 NOTE — PATIENT INSTRUCTIONS
You may take over the counter Tylenol (Acetaminophen) and/or Motrin (Ibuprofen) as needed, as directed on packaging.   Be sure to get plenty of rest, and drinking fluids to remain hydrated.     Please follow up with your primary provider in the next several days. Should you have any worsening of symptoms, or lack of improvement please be re-evaluated. If needed for significant concerns, consider 911 or ER evaluation.     Over the counter decongestants can be used, only as directed on the box. However if you have any history of cardiac disease or high blood pressure these should be avoided, as we caution the use of these since they can make place strain on your heart and cause increase in blood pressure. It is recommended in lieu of decongestants to use cool mist vaporizer, saline nasal spray to relieve nasal congestion. It is also important to remain hydrated and drink plenty of fluids (avoiding caffeine and alcohol).   Mucinex is an expectorant medication which will help to relieve the chest congestion, it is important to drink lots of fluids and keep hydrated.     The unnecessary use of antibiotics can have harmful affect, unwanted side-effects and can lead to antibiotic resistant bacteria in the future. You are being treated today for a viral illness. Viral illnesses do not require antibiotics, and are treated symptomatically.   According to the Centers for Disease Control and Prevention, about one-third of antibiotic use in the outpatient setting, is not needed nor appropriate. Antibiotics treat infections caused by bacteria. But they don't treat infections caused by viruses (viral infections). For example, an antibiotic is the correct treatment for strep throat, which is caused by bacteria. But it's not the right treatment for most sore throats, which are caused by viruses.By being proactive and treating your individual symptoms, this may help you feel better.     You may have had testing done today which when  completed and resulted may change the course of your treatment. It is at that time that if a change in your treatment is necessary that you will hear from our office. I would also recommend you follow up with your primary care provider in the next few days.

## 2024-02-21 LAB
FLUAV RNA RESP QL NAA+PROBE: NEGATIVE
FLUBV RNA RESP QL NAA+PROBE: NEGATIVE
SARS-COV-2 RNA RESP QL NAA+PROBE: NEGATIVE

## 2024-05-02 ENCOUNTER — APPOINTMENT (EMERGENCY)
Dept: CT IMAGING | Facility: HOSPITAL | Age: 44
DRG: 143 | End: 2024-05-02
Payer: COMMERCIAL

## 2024-05-02 ENCOUNTER — APPOINTMENT (EMERGENCY)
Dept: RADIOLOGY | Facility: HOSPITAL | Age: 44
DRG: 143 | End: 2024-05-02
Payer: COMMERCIAL

## 2024-05-02 ENCOUNTER — HOSPITAL ENCOUNTER (INPATIENT)
Facility: HOSPITAL | Age: 44
LOS: 3 days | Discharge: HOME | DRG: 143 | End: 2024-05-05
Attending: EMERGENCY MEDICINE | Admitting: FAMILY MEDICINE
Payer: COMMERCIAL

## 2024-05-02 DIAGNOSIS — J93.9 PNEUMOTHORAX ON RIGHT: Primary | ICD-10-CM

## 2024-05-02 DIAGNOSIS — J93.11 PRIMARY SPONTANEOUS PNEUMOTHORAX: ICD-10-CM

## 2024-05-02 DIAGNOSIS — J44.9 CHRONIC OBSTRUCTIVE PULMONARY DISEASE (HCC): ICD-10-CM

## 2024-05-02 DIAGNOSIS — Z72.0 TOBACCO ABUSE: ICD-10-CM

## 2024-05-02 PROBLEM — R07.89 OTHER CHEST PAIN: Status: ACTIVE | Noted: 2024-05-02

## 2024-05-02 LAB
2HR DELTA HS TROPONIN: <-1 NG/L
ALBUMIN SERPL BCP-MCNC: 4.8 G/DL (ref 3.5–5)
ALP SERPL-CCNC: 65 U/L (ref 34–104)
ALT SERPL W P-5'-P-CCNC: 16 U/L (ref 7–52)
ANION GAP SERPL CALCULATED.3IONS-SCNC: 8 MMOL/L (ref 4–13)
AST SERPL W P-5'-P-CCNC: 16 U/L (ref 13–39)
BASOPHILS # BLD AUTO: 0.05 THOUSANDS/ÂΜL (ref 0–0.1)
BASOPHILS NFR BLD AUTO: 1 % (ref 0–1)
BILIRUB SERPL-MCNC: 0.57 MG/DL (ref 0.2–1)
BUN SERPL-MCNC: 15 MG/DL (ref 5–25)
CALCIUM SERPL-MCNC: 9.7 MG/DL (ref 8.4–10.2)
CARDIAC TROPONIN I PNL SERPL HS: 3 NG/L
CARDIAC TROPONIN I PNL SERPL HS: <2 NG/L
CHLORIDE SERPL-SCNC: 105 MMOL/L (ref 96–108)
CO2 SERPL-SCNC: 26 MMOL/L (ref 21–32)
CREAT SERPL-MCNC: 0.91 MG/DL (ref 0.6–1.3)
EOSINOPHIL # BLD AUTO: 0.23 THOUSAND/ÂΜL (ref 0–0.61)
EOSINOPHIL NFR BLD AUTO: 3 % (ref 0–6)
ERYTHROCYTE [DISTWIDTH] IN BLOOD BY AUTOMATED COUNT: 12.8 % (ref 11.6–15.1)
GFR SERPL CREATININE-BSD FRML MDRD: 102 ML/MIN/1.73SQ M
GLUCOSE SERPL-MCNC: 101 MG/DL (ref 65–140)
HCT VFR BLD AUTO: 48.9 % (ref 36.5–49.3)
HGB BLD-MCNC: 16.4 G/DL (ref 12–17)
IMM GRANULOCYTES # BLD AUTO: 0.01 THOUSAND/UL (ref 0–0.2)
IMM GRANULOCYTES NFR BLD AUTO: 0 % (ref 0–2)
LYMPHOCYTES # BLD AUTO: 2.71 THOUSANDS/ÂΜL (ref 0.6–4.47)
LYMPHOCYTES NFR BLD AUTO: 30 % (ref 14–44)
MAGNESIUM SERPL-MCNC: 2.2 MG/DL (ref 1.9–2.7)
MCH RBC QN AUTO: 30.8 PG (ref 26.8–34.3)
MCHC RBC AUTO-ENTMCNC: 33.5 G/DL (ref 31.4–37.4)
MCV RBC AUTO: 92 FL (ref 82–98)
MONOCYTES # BLD AUTO: 0.67 THOUSAND/ÂΜL (ref 0.17–1.22)
MONOCYTES NFR BLD AUTO: 7 % (ref 4–12)
NEUTROPHILS # BLD AUTO: 5.43 THOUSANDS/ÂΜL (ref 1.85–7.62)
NEUTS SEG NFR BLD AUTO: 59 % (ref 43–75)
NRBC BLD AUTO-RTO: 0 /100 WBCS
PHOSPHATE SERPL-MCNC: 3.4 MG/DL (ref 2.7–4.5)
PLATELET # BLD AUTO: 311 THOUSANDS/UL (ref 149–390)
PMV BLD AUTO: 8.9 FL (ref 8.9–12.7)
POTASSIUM SERPL-SCNC: 3.8 MMOL/L (ref 3.5–5.3)
PROT SERPL-MCNC: 7.9 G/DL (ref 6.4–8.4)
RBC # BLD AUTO: 5.32 MILLION/UL (ref 3.88–5.62)
SODIUM SERPL-SCNC: 139 MMOL/L (ref 135–147)
WBC # BLD AUTO: 9.1 THOUSAND/UL (ref 4.31–10.16)

## 2024-05-02 PROCEDURE — 93005 ELECTROCARDIOGRAM TRACING: CPT

## 2024-05-02 PROCEDURE — 84484 ASSAY OF TROPONIN QUANT: CPT | Performed by: EMERGENCY MEDICINE

## 2024-05-02 PROCEDURE — 87635 SARS-COV-2 COVID-19 AMP PRB: CPT | Performed by: PHYSICIAN ASSISTANT

## 2024-05-02 PROCEDURE — 71045 X-RAY EXAM CHEST 1 VIEW: CPT

## 2024-05-02 PROCEDURE — 71046 X-RAY EXAM CHEST 2 VIEWS: CPT

## 2024-05-02 PROCEDURE — 99285 EMERGENCY DEPT VISIT HI MDM: CPT

## 2024-05-02 PROCEDURE — 85025 COMPLETE CBC W/AUTO DIFF WBC: CPT | Performed by: EMERGENCY MEDICINE

## 2024-05-02 PROCEDURE — 99222 1ST HOSP IP/OBS MODERATE 55: CPT | Performed by: PHYSICIAN ASSISTANT

## 2024-05-02 PROCEDURE — 83735 ASSAY OF MAGNESIUM: CPT | Performed by: EMERGENCY MEDICINE

## 2024-05-02 PROCEDURE — 36415 COLL VENOUS BLD VENIPUNCTURE: CPT | Performed by: EMERGENCY MEDICINE

## 2024-05-02 PROCEDURE — 84100 ASSAY OF PHOSPHORUS: CPT | Performed by: EMERGENCY MEDICINE

## 2024-05-02 PROCEDURE — 71250 CT THORAX DX C-: CPT

## 2024-05-02 PROCEDURE — 80053 COMPREHEN METABOLIC PANEL: CPT | Performed by: EMERGENCY MEDICINE

## 2024-05-02 RX ORDER — LIDOCAINE HYDROCHLORIDE AND EPINEPHRINE 10; 10 MG/ML; UG/ML
10 INJECTION, SOLUTION INFILTRATION; PERINEURAL ONCE
Status: COMPLETED | OUTPATIENT
Start: 2024-05-02 | End: 2024-05-02

## 2024-05-02 RX ORDER — HEPARIN SODIUM 5000 [USP'U]/ML
5000 INJECTION, SOLUTION INTRAVENOUS; SUBCUTANEOUS EVERY 8 HOURS SCHEDULED
Status: DISCONTINUED | OUTPATIENT
Start: 2024-05-02 | End: 2024-05-05 | Stop reason: HOSPADM

## 2024-05-02 RX ORDER — KETAMINE HCL IN NACL, ISO-OSM 100MG/10ML
0.25 SYRINGE (ML) INJECTION ONCE
Status: COMPLETED | OUTPATIENT
Start: 2024-05-02 | End: 2024-05-02

## 2024-05-02 RX ORDER — KETAMINE HYDROCHLORIDE 50 MG/ML
20 INJECTION, SOLUTION INTRAMUSCULAR; INTRAVENOUS ONCE AS NEEDED
Status: DISCONTINUED | OUTPATIENT
Start: 2024-05-02 | End: 2024-05-02

## 2024-05-02 RX ORDER — KETOROLAC TROMETHAMINE 30 MG/ML
15 INJECTION, SOLUTION INTRAMUSCULAR; INTRAVENOUS ONCE
Status: COMPLETED | OUTPATIENT
Start: 2024-05-02 | End: 2024-05-02

## 2024-05-02 RX ORDER — NICOTINE 21 MG/24HR
1 PATCH, TRANSDERMAL 24 HOURS TRANSDERMAL DAILY
Status: DISCONTINUED | OUTPATIENT
Start: 2024-05-03 | End: 2024-05-05 | Stop reason: HOSPADM

## 2024-05-02 RX ORDER — HYDROMORPHONE HCL/PF 1 MG/ML
0.5 SYRINGE (ML) INJECTION ONCE
Status: COMPLETED | OUTPATIENT
Start: 2024-05-02 | End: 2024-05-02

## 2024-05-02 RX ORDER — FENTANYL CITRATE 50 UG/ML
50 INJECTION, SOLUTION INTRAMUSCULAR; INTRAVENOUS ONCE
Status: COMPLETED | OUTPATIENT
Start: 2024-05-02 | End: 2024-05-02

## 2024-05-02 RX ORDER — MIDAZOLAM HYDROCHLORIDE 2 MG/2ML
2 INJECTION, SOLUTION INTRAMUSCULAR; INTRAVENOUS ONCE
Status: COMPLETED | OUTPATIENT
Start: 2024-05-02 | End: 2024-05-02

## 2024-05-02 RX ORDER — SODIUM CHLORIDE 9 MG/ML
125 INJECTION, SOLUTION INTRAVENOUS CONTINUOUS
Status: DISCONTINUED | OUTPATIENT
Start: 2024-05-02 | End: 2024-05-03

## 2024-05-02 RX ORDER — MIDAZOLAM HYDROCHLORIDE 2 MG/2ML
2 INJECTION, SOLUTION INTRAMUSCULAR; INTRAVENOUS ONCE AS NEEDED
Status: DISCONTINUED | OUTPATIENT
Start: 2024-05-02 | End: 2024-05-02

## 2024-05-02 RX ADMIN — LIDOCAINE HYDROCHLORIDE,EPINEPHRINE BITARTRATE 10 ML: 10; .01 INJECTION, SOLUTION INFILTRATION; PERINEURAL at 17:23

## 2024-05-02 RX ADMIN — MIDAZOLAM 2 MG: 1 INJECTION INTRAMUSCULAR; INTRAVENOUS at 17:24

## 2024-05-02 RX ADMIN — KETOROLAC TROMETHAMINE 15 MG: 30 INJECTION, SOLUTION INTRAMUSCULAR; INTRAVENOUS at 18:36

## 2024-05-02 RX ADMIN — HYDROMORPHONE HYDROCHLORIDE 0.5 MG: 1 INJECTION, SOLUTION INTRAMUSCULAR; INTRAVENOUS; SUBCUTANEOUS at 21:39

## 2024-05-02 RX ADMIN — SODIUM CHLORIDE 125 ML/HR: 0.9 INJECTION, SOLUTION INTRAVENOUS at 18:36

## 2024-05-02 RX ADMIN — FENTANYL CITRATE 50 MCG: 50 INJECTION, SOLUTION INTRAMUSCULAR; INTRAVENOUS at 19:50

## 2024-05-02 RX ADMIN — HEPARIN SODIUM 5000 UNITS: 5000 INJECTION, SOLUTION INTRAVENOUS; SUBCUTANEOUS at 22:00

## 2024-05-02 RX ADMIN — Medication 23 MG: at 17:24

## 2024-05-02 NOTE — ED PROVIDER NOTES
History  Chief Complaint   Patient presents with    Chest Pain     44-year-old male presents for evaluation of right-sided crampy chest pain.  Patient states the pain onset approximately 2 days ago without any obvious etiology and has been persistent.  The pain is localized to the right upper chest and is exacerbated with movement.  Radiation to the right shoulder/neck.  Patient with history of pneumothorax but states that this does not feel the same.  Patient does smoke and intermittently coughs.  No shortness of breath, nausea or vomiting, dizziness or diaphoresis.  No lower extremity edema or swelling.  No recent long distance travel or dehydration.  No hemoptysis or change in sputum.  No recent illness.  No fever or chills.  No ripping or tearing type pain reported.  No focal neurological deficits.  Advil for symptom relief with minimal effect.  On exam, the patient is overall well-appearing and not in acute distress.      Chest Pain  Associated symptoms: no abdominal pain, no back pain, no cough, no diaphoresis, no dizziness, no dysphagia, no fatigue, no fever, no headache, no nausea, no numbness, no palpitations, no shortness of breath, not vomiting and no weakness        None       Past Medical History:   Diagnosis Date    Collapsed lung 8 years ago    chest tube placement    Collapsed lung     Renal disorder        History reviewed. No pertinent surgical history.    History reviewed. No pertinent family history.  I have reviewed and agree with the history as documented.    E-Cigarette/Vaping    E-Cigarette Use Current Some Day User      E-Cigarette/Vaping Substances    THC Yes      Social History     Tobacco Use    Smoking status: Every Day     Current packs/day: 1.00     Types: Cigarettes    Smokeless tobacco: Never   Vaping Use    Vaping status: Some Days    Substances: THC   Substance Use Topics    Alcohol use: Not Currently     Comment: occasional    Drug use: No       Review of Systems   Constitutional:   Negative for activity change, appetite change, chills, diaphoresis, fatigue and fever.   HENT:  Negative for dental problem, ear pain, sore throat, trouble swallowing and voice change.    Eyes:  Negative for pain and visual disturbance.   Respiratory:  Negative for cough, chest tightness, shortness of breath and wheezing.    Cardiovascular:  Positive for chest pain. Negative for palpitations and leg swelling.   Gastrointestinal:  Negative for abdominal pain, anal bleeding, blood in stool, diarrhea, nausea, rectal pain and vomiting.   Endocrine: Negative for polydipsia, polyphagia and polyuria.   Genitourinary:  Negative for difficulty urinating, dysuria, flank pain, frequency, hematuria and urgency.   Musculoskeletal:  Negative for back pain, joint swelling, myalgias, neck pain and neck stiffness.   Skin:  Negative for pallor, rash and wound.   Neurological:  Negative for dizziness, facial asymmetry, speech difficulty, weakness, light-headedness, numbness and headaches.   Hematological:  Negative for adenopathy.   Psychiatric/Behavioral:  Negative for agitation. The patient is not nervous/anxious.    All other systems reviewed and are negative.      Physical Exam  Physical Exam  Vitals and nursing note reviewed.   Constitutional:       General: He is not in acute distress.     Appearance: He is well-developed. He is not ill-appearing, toxic-appearing or diaphoretic.   HENT:      Head: Normocephalic and atraumatic.      Right Ear: External ear normal.      Left Ear: External ear normal.      Nose: Nose normal.      Mouth/Throat:      Mouth: Mucous membranes are moist.   Eyes:      General: No visual field deficit or scleral icterus.        Right eye: No discharge.         Left eye: No discharge.      Conjunctiva/sclera: Conjunctivae normal.      Pupils: Pupils are equal, round, and reactive to light.   Neck:      Thyroid: No thyromegaly.      Vascular: No JVD.      Trachea: No tracheal deviation.   Cardiovascular:       Rate and Rhythm: Normal rate and regular rhythm.      Pulses: Normal pulses.      Heart sounds: Normal heart sounds, S1 normal and S2 normal. No murmur heard.     No friction rub. No gallop.   Pulmonary:      Effort: Pulmonary effort is normal. No accessory muscle usage, respiratory distress or retractions.      Breath sounds: Normal breath sounds and air entry. No stridor or decreased air movement. No decreased breath sounds, wheezing, rhonchi or rales.   Chest:      Chest wall: No mass, lacerations, deformity, swelling, tenderness, crepitus or edema. There is no dullness to percussion.      Comments: No subcutaneous emphysema.  Abdominal:      General: There is no distension.      Palpations: Abdomen is soft. There is no mass.      Tenderness: There is no abdominal tenderness. There is no guarding or rebound.      Hernia: No hernia is present.   Musculoskeletal:         General: No swelling, tenderness or deformity. Normal range of motion.      Cervical back: Normal range of motion and neck supple.      Right lower leg: No edema.      Left lower leg: No edema.   Lymphadenopathy:      Cervical: No cervical adenopathy.   Skin:     General: Skin is warm and dry.      Capillary Refill: Capillary refill takes less than 2 seconds.      Coloration: Skin is not pale.      Findings: No erythema or rash.   Neurological:      General: No focal deficit present.      Mental Status: He is alert and oriented to person, place, and time.      GCS: GCS eye subscore is 4. GCS verbal subscore is 5. GCS motor subscore is 6.      Cranial Nerves: Cranial nerves 2-12 are intact. No cranial nerve deficit, dysarthria or facial asymmetry.      Sensory: Sensation is intact. No sensory deficit.      Motor: Motor function is intact. No weakness, tremor, atrophy, abnormal muscle tone or seizure activity.      Coordination: Coordination is intact. Coordination normal.      Gait: Gait is intact.      Deep Tendon Reflexes: Reflexes are normal  and symmetric. Reflexes normal.   Psychiatric:         Behavior: Behavior normal.         Vital Signs  ED Triage Vitals   Temperature Pulse Respirations Blood Pressure SpO2   05/02/24 1535 05/02/24 1535 05/02/24 1535 05/02/24 1535 05/02/24 1535   (!) 97 °F (36.1 °C) (!) 108 18 151/97 96 %      Temp Source Heart Rate Source Patient Position - Orthostatic VS BP Location FiO2 (%)   05/02/24 1535 05/02/24 1535 05/02/24 1535 05/02/24 1535 --   Temporal Monitor Sitting Right arm       Pain Score       05/02/24 1536       6           Vitals:    05/02/24 1750 05/02/24 1751 05/02/24 1752 05/02/24 1754   BP: 154/82  166/92 (!) 172/89   Pulse: (!) 123 (!) 122  (!) 121   Patient Position - Orthostatic VS: Lying            Visual Acuity      ED Medications  Medications   midazolam (VERSED) injection 2 mg (has no administration in time range)   ketamine (KETALAR) 20 mg (has no administration in time range)   lidocaine-epinephrine (XYLOCAINE/EPINEPHRINE) 1 %-1:100,000 injection 10 mL (10 mL Infiltration Given by Other 5/2/24 1723)   midazolam (VERSED) injection 2 mg (2 mg Intravenous Given 5/2/24 1724)   Ketamine HCl 23 mg (23 mg Intravenous Given 5/2/24 1724)       Diagnostic Studies  Results Reviewed       Procedure Component Value Units Date/Time    HS Troponin I 2hr [[id number]] Collected: 05/02/24 1802    Lab Status: In process Specimen: Blood from Arm, Right Updated: 05/02/24 1805    HS Troponin I 4hr [[id number]]     Lab Status: No result Specimen: Blood     HS Troponin 0hr (reflex protocol) [[id number]]  (Normal) Collected: 05/02/24 1555    Lab Status: Final result Specimen: Blood from Arm, Right Updated: 05/02/24 1624     hs TnI 0hr 3 ng/L     Comprehensive metabolic panel [[id number]] Collected: 05/02/24 1555    Lab Status: Final result Specimen: Blood from Arm, Right Updated: 05/02/24 1618     Sodium 139 mmol/L      Potassium 3.8 mmol/L      Chloride 105 mmol/L      CO2 26 mmol/L      ANION GAP 8 mmol/L      BUN 15 mg/dL       Creatinine 0.91 mg/dL      Glucose 101 mg/dL      Calcium 9.7 mg/dL      AST 16 U/L      ALT 16 U/L      Alkaline Phosphatase 65 U/L      Total Protein 7.9 g/dL      Albumin 4.8 g/dL      Total Bilirubin 0.57 mg/dL      eGFR 102 ml/min/1.73sq m     Narrative:      [employer] guidelines for Chronic Kidney Disease (CKD):     Stage 1 with normal or high GFR (GFR > 90 mL/min/1.73 square meters)    Stage 2 Mild CKD (GFR = 60-89 mL/min/1.73 square meters)    Stage 3A Moderate CKD (GFR = 45-59 mL/min/1.73 square meters)    Stage 3B Moderate CKD (GFR = 30-44 mL/min/1.73 square meters)    Stage 4 Severe CKD (GFR = 15-29 mL/min/1.73 square meters)    Stage 5 End Stage CKD (GFR <15 mL/min/1.73 square meters)  Note: GFR calculation is accurate only with a steady state creatinine    Phosphorus [[id number]]  (Normal) Collected: 05/02/24 1555    Lab Status: Final result Specimen: Blood from Arm, Right Updated: 05/02/24 1618     Phosphorus 3.4 mg/dL     Magnesium [[id number]]  (Normal) Collected: 05/02/24 1555    Lab Status: Final result Specimen: Blood from Arm, Right Updated: 05/02/24 1618     Magnesium 2.2 mg/dL     CBC and differential [[id number]] Collected: 05/02/24 1555    Lab Status: Final result Specimen: Blood from Arm, Right Updated: 05/02/24 1606     WBC 9.10 Thousand/uL      RBC 5.32 Million/uL      Hemoglobin 16.4 g/dL      Hematocrit 48.9 %      MCV 92 fL      MCH 30.8 pg      MCHC 33.5 g/dL      RDW 12.8 %      MPV 8.9 fL      Platelets 311 Thousands/uL      nRBC 0 /100 WBCs      Segmented % 59 %      Immature Grans % 0 %      Lymphocytes % 30 %      Monocytes % 7 %      Eosinophils Relative 3 %      Basophils Relative 1 %      Absolute Neutrophils 5.43 Thousands/µL      Absolute Immature Grans 0.01 Thousand/uL      Absolute Lymphocytes 2.71 Thousands/µL      Absolute Monocytes 0.67 Thousand/µL      Eosinophils Absolute 0.23 Thousand/µL      Basophils Absolute 0.05 Thousands/µL                     XR chest portable   ED Interpretation by Jonathan Jacobs DO (05/02 1814)   Postprocedure x-ray.  12 Syrian chest tube in place.  Note lung reexpansion.      CT chest without contrast   Final Result by Bennie Hill MD (05/02 1724)      Large right hydropneumothorax with collapse of the entire right lobe. No significant mediastinal shift to suggest tension.      Emphysema. No underlying cystic lung disease.                     Workstation performed: [id number]         XR chest 2 views   ED Interpretation by Jonathan Jacobs DO (05/02 1637)   Large right-sided pneumothorax noted.      Final Result by Bennie Hill MD (05/02 1725)      Large right pneumothorax with near complete collapse of the right lung. No significant mediastinal shift.         Findings concur with preliminary interpretation documented by Dr. Jacobs in the emergency room.      Workstation performed: [id number]                    Procedures  ECG 12 Lead Documentation Only    Date/Time: 5/2/2024 3:37 PM    Performed by: Jonathan Jacobs DO  Authorized by: Jonathan Jacobs DO    Indications / Diagnosis:  Chest pain  ECG reviewed by me, the ED Provider: yes    Patient location:  ED  Previous ECG:     Previous ECG:  Unavailable    Comparison to cardiac monitor: Yes    Interpretation:     Interpretation: abnormal    Rate:     ECG rate:  102    ECG rate assessment: tachycardic    Rhythm:     Rhythm: sinus rhythm    Ectopy:     Ectopy: none    QRS:     QRS axis:  Normal    QRS intervals:  Normal  Conduction:     Conduction: abnormal      Abnormal conduction: incomplete RBBB    ST segments:     ST segments:  Non-specific  Chest Tube    Date/Time: 5/2/2024 6:14 PM    Performed by: Jonathan Jacobs DO  Authorized by: Jonathan Jacobs DO    Patient location:  ED  Other Assisting Provider: Yes (comment)    Consent:     Consent obtained:  Written    Consent given by:  Patient    Risks discussed:  Bleeding, damage to  surrounding structures, incomplete drainage, nerve damage, pain and infection  Universal protocol:     Procedure explained and questions answered to patient or proxy's satisfaction: yes      Patient identity confirmed:  Verbally with patient and arm band  Pre-procedure details:     Skin preparation:  ChloraPrep    Preparation: Patient was prepped and draped in the usual sterile fashion    Indications:     Indications: pneumothroax    Sedation:     Sedation type:  Anxiolysis  Anesthesia (see MAR for exact dosages):     Anesthesia method:  Local infiltration    Local anesthetic:  Lidocaine 1% WITH epi  Procedure details:     Placement location:  Anterior    Laterality:  Right    Approach:  Percutaneous    Scalpel size:  10    Access kit used: 12.    Tube size (Fr):  12    Ultrasound guidance: no      Tension pneumothorax: no      Needle Decompression: no      Tube connected to:  Suction    Drainage characteristics:  Air only    Suture material:  0 silk    Dressing:  4x4 sterile gauze and petrolatum-impregnated gauze  Post-procedure details:     Post-insertion x-ray findings: tube in good position      Post-insertion x-ray findings comment:  Lung re-expanded    Patient tolerance of procedure:  Tolerated well, no immediate complications  Comments:      Versed. Ketamine for analgesia.    Conscious Sedation Assessment      Flowsheet Row Classification Score   ASA Scale Assessment 1-Healthy patient, no disease outside surgical process filed at 05/02/2024 1726               ED Course         1820 hrs.: Conferred with thoracic surgery [facility] and does not feel need to transfer patient at this time.  Last pneumothorax was on the left side and this time it is the right side.  Would not rush to surgery at this time.  Suggest conservative management.  If any changes, can transfer to [place] for thoracic surgery evaluation.  At this time, the patient is resting comfortably and is stable.  Chest tube is in place.   Lung is reexpanded on chest x-ray.  Will admit patient to [facility].            SBIRT 20yo+      Flowsheet Row Most Recent Value   Initial Alcohol Screen: US AUDIT-C     1. How often do you have a drink containing alcohol? 0 Filed at: 05/02/2024 1538   2. How many drinks containing alcohol do you have on a typical day you are drinking?  0 Filed at: 05/02/2024 1538   3a. Male UNDER 65: How often do you have five or more drinks on one occasion? 0 Filed at: 05/02/2024 1538   Audit-C Score 0 Filed at: 05/02/2024 1538   KRYSTA: How many times in the past year have you...    Used an illegal drug or used a prescription medication for non-medical reasons? Never Filed at: 05/02/2024 1609                      Medical Decision Making  Amount and/or Complexity of Data Reviewed  Labs: ordered.  Radiology: ordered and independent interpretation performed.    Risk  Prescription drug management.             Disposition  Final diagnoses:   Pneumothorax on right     Time reflects when diagnosis was documented in both MDM as applicable and the Disposition within this note       Time User Action Codes Description Comment    5/2/2024  6:27 PM Jonathan Jacobs Add [J93.9] Pneumothorax on right           ED Disposition       ED Disposition   Admit    Condition   Stable    Date/Time   Thu May 2, 2024 1826    Comment   Case was discussed with JAREN and the patient's admission status was agreed to be Admission Status: inpatient status to the service of Dr. Nicholson .               Follow-up Information    None         Patient's Medications    No medications on file       No discharge procedures on file.    PDMP Review       None            ED Provider  Electronically Signed by             Jonathan Jacobs DO  05/02/24 3560

## 2024-05-02 NOTE — ASSESSMENT & PLAN NOTE
Presented to the ER with complaints of right-sided chest pain   Patient reports being persistent over the past 2 days with radiation to right shoulder and his neck  CT shows-Large right hydropneumothorax with collapse of the entire right lobe. No significant mediastinal shift to suggest tension.   Chest tube place in the ER  Admit to step down  Cardiopulmonary monitoring  Pain medication as needed  Monitor respiratory status, SpO2  Repeat chest x-ray in the a.m.  Pulmonary consult  Supportive care

## 2024-05-02 NOTE — H&P
[facility]  H&P  Name: Marcus Barrera 44 y.o. male I MRN: [id number]  Unit/Bed#:  I Date of Admission: 5/2/2024   Date of Service: 5/3/2024 I Hospital Day: 1      Assessment/Plan   * Primary spontaneous pneumothorax  Assessment & Plan  Presented to the ER with complaints of right-sided chest pain   Patient reports being persistent over the past 2 days with radiation to right shoulder and his neck  CT shows-Large right hydropneumothorax with collapse of the entire right lobe. No significant mediastinal shift to suggest tension.   Chest tube place in the ER  Admit to step down  Cardiopulmonary monitoring  Pain medication as needed  Monitor respiratory status, SpO2  Repeat chest x-ray in the a.m.  Pulmonary consult  Supportive care    Other chest pain  Assessment & Plan  Presented to the ER with complaints of right-sided  chest pain  Most likely secondary to pneumothorax  Troponins negative  EKG shows a sinus tachycardia at a rate of 102 bpm  Cardiopulmonary monitoring  Continue management of pneumothorax  Continue to follow     Smoker  Assessment & Plan  Smoking cessation education   Nicotine patch         VTE Pharmacologic Prophylaxis:   Moderate Risk (Score 3-4) - Pharmacological DVT Prophylaxis Ordered: heparin.  Code Status: Level 1 - Full Code level 1 full code  Discussion with family: Patient declined call to .     Anticipated Length of Stay: Patient will be admitted on an inpatient basis with an anticipated length of stay of greater than 2 midnights secondary to primary spontaneous pneumothorax, chest pain.    Total Time Spent on Date of Encounter in care of patient: 40 mins. This time was spent on one or more of the following: performing physical exam; counseling and coordination of care; obtaining or reviewing history; documenting in the medical record; reviewing/ordering tests, medications or procedures; communicating with other healthcare professionals and  discussing with patient's family/caregivers.    Chief Complaint: Chest pain    History of Present Illness:  Marcus Barrera is a 44 y.o. male with a PMH of pneumothorax who presents to the emergency room for evaluation of complaint of right-sided crampy chest pain.  Patient reports that chest pain started 2 days ago and has been persistent since then.  Reports that his pain is worse with movement and radiates to his right shoulder and his neck.  Patient reports that chest pain feels very similar to when he had a pneumothorax in the past.  Patient denies having any shortness of breath, nausea, vomiting, dizziness, weakness, hemoptysis.  Patient reported that he took Advil for relief however he did not get any significant relief with use of Advil.    Workup in the emergency room included labs which were essentially within normal limits.  CT chest chest x-ray completed with results as shown below.  EKG shows sinus tachycardia at a rate of 102 bpm.  While in the emergency room patient received Toradol 15 mg IV, ketamine 23 mg IV, Versed 2 mg IV.  Patient had chest tube inserted while in the emergency room.    Patient is being admitted on inpatient status stepdown 1 level care for further workup and management of mammillary spontaneous pneumothorax, chest pain    Review of Systems:  Review of Systems   Constitutional:  Positive for activity change. Negative for chills, fatigue and fever.   Eyes:  Negative for photophobia and visual disturbance.   Respiratory:  Negative for shortness of breath and wheezing.    Cardiovascular:  Positive for chest pain. Negative for palpitations and leg swelling.   Gastrointestinal:  Negative for abdominal pain, diarrhea, nausea and vomiting.   Genitourinary:  Negative for difficulty urinating, dysuria, flank pain, frequency and hematuria.   Musculoskeletal:  Negative for arthralgias, back pain, gait problem, neck pain and neck stiffness.   Skin:  Negative for rash and wound.  "  Neurological:  Negative for dizziness, tremors, seizures, syncope, weakness and headaches.   Psychiatric/Behavioral:  Negative for agitation and confusion. The patient is not nervous/anxious.        Past Medical and Surgical History:   Past Medical History:   Diagnosis Date    Collapsed lung 8 years ago    chest tube placement    Collapsed lung     Renal disorder        History reviewed. No pertinent surgical history.    Meds/Allergies:  Prior to Admission medications    Not on File     I have reviewed home medications using recent Epic encounter.    Allergies: No Known Allergies    Social History:  Marital Status: Single   Occupation:   Patient Pre-hospital Living Situation: Home  Patient Pre-hospital Level of Mobility: walks  Patient Pre-hospital Diet Restrictions: None reported  Substance Use History:   Social History     Substance and Sexual Activity   Alcohol Use Not Currently    Comment: occasional     Social History     Tobacco Use   Smoking Status Every Day    Current packs/day: 1.00    Types: Cigarettes   Smokeless Tobacco Never     Social History     Substance and Sexual Activity   Drug Use No       Family History:  History reviewed. No pertinent family history.    Physical Exam:     Vitals:   Blood Pressure: 130/87 (05/03/24 0000)  Pulse: 96 (05/03/24 0000)  Temperature: 98.3 °F (36.8 °C) (05/02/24 1938)  Temp Source: Temporal (05/02/24 1938)  Respirations: 19 (05/03/24 0000)  Height: 6' 2\" (188 cm) (05/02/24 1905)  Weight - Scale: 86.9 kg (191 lb 9.3 oz) (05/02/24 1905)  SpO2: 97 % (05/03/24 0000)    Physical Exam  HENT:      Head: Normocephalic and atraumatic.      Mouth/Throat:      Mouth: Mucous membranes are moist.      Pharynx: Oropharynx is clear.   Eyes:      Pupils: Pupils are equal, round, and reactive to light.   Cardiovascular:      Rate and Rhythm: Normal rate and regular rhythm.      Pulses: Normal pulses.   Pulmonary:      Effort: No respiratory distress.      Breath sounds: No stridor. " No rales.      Comments: Intact chest tube  Chest:      Chest wall: Tenderness present.   Abdominal:      General: There is no distension.      Tenderness: There is no abdominal tenderness.   Musculoskeletal:         General: No swelling or tenderness.      Cervical back: Neck supple.      Right lower leg: No edema.      Left lower leg: No edema.   Skin:     Capillary Refill: Capillary refill takes less than 2 seconds.      Coloration: Skin is not jaundiced or pale.   Neurological:      Mental Status: He is alert and oriented to person, place, and time.   Psychiatric:         Mood and Affect: Mood normal.          Additional Data:     Lab Results:  Results from last 7 days   Lab Units 05/02/24  1555   WBC Thousand/uL 9.10   HEMOGLOBIN g/dL 16.4   HEMATOCRIT % 48.9   PLATELETS Thousands/uL 311   SEGS PCT % 59   LYMPHO PCT % 30   MONO PCT % 7   EOS PCT % 3     Results from last 7 days   Lab Units 05/02/24  1555   SODIUM mmol/L 139   POTASSIUM mmol/L 3.8   CHLORIDE mmol/L 105   CO2 mmol/L 26   BUN mg/dL 15   CREATININE mg/dL 0.91   ANION GAP mmol/L 8   CALCIUM mg/dL 9.7   ALBUMIN g/dL 4.8   TOTAL BILIRUBIN mg/dL 0.57   ALK PHOS U/L 65   ALT U/L 16   AST U/L 16   GLUCOSE RANDOM mg/dL 101                       Lines/Drains:  Invasive Devices       Peripheral Intravenous Line  Duration             Peripheral IV 05/02/24 Right;Ventral (anterior) Forearm <1 day              Drain  Duration             Chest Tube Right Fourth intercostal space <1 day                        Imaging: Reviewed radiology reports from this admission including: chest xray and chest CT scan  XR chest portable   ED Interpretation by Jonathan Jacobs DO (05/02 1814)   Postprocedure x-ray.  12 Kisw[place]ili chest tube in place.  Note lung reexpansion.      CT chest without contrast   Final Result by Bennie Hill MD (05/02 1724)      Large right hydropneumothorax with collapse of the entire right lobe. No significant mediastinal shift to suggest  tension.      Emphysema. No underlying cystic lung disease.                     Workstation performed: [id number]         XR chest 2 views   ED Interpretation by Jonathan Jacobs DO (05/02 1637)   Large right-sided pneumothorax noted.      Final Result by Bennie Hill MD (05/02 1725)      Large right pneumothorax with near complete collapse of the right lung. No significant mediastinal shift.         Findings concur with preliminary interpretation documented by Dr. Jacobs in the emergency room.      Workstation performed: [id number]         XR chest portable    (Results Pending)       EKG and Other Studies Reviewed on Admission:   EKG: Sinus Tachycardia.  bpm.    ** Please Note: This note has been constructed using a voice recognition system. **

## 2024-05-02 NOTE — LETTER
[facility] INTENSIVE CARE UNIT  [address] [facility] [address]  Dept: [phone]    May 5, 2024     Patient: Marcus Barrera   YOB: 1980   Date of Visit: 5/2/2024       To Whom it May Concern:    Marcus Barrera is under my professional care. He was seen in the hospital from 5/2/2024 to 05/05/24. He may return to work on 5/7/2024 without limitations.    If you have any questions or concerns, please don't hesitate to call.         Sincerely,          Alonzo Nicholson MD

## 2024-05-02 NOTE — ASSESSMENT & PLAN NOTE
Presented to the ER with complaints of right-sided  chest pain  Most likely secondary to pneumothorax  Troponins negative  EKG shows a sinus tachycardia at a rate of 102 bpm  Cardiopulmonary monitoring  Continue management of pneumothorax  Continue to follow

## 2024-05-03 ENCOUNTER — APPOINTMENT (INPATIENT)
Dept: RADIOLOGY | Facility: HOSPITAL | Age: 44
DRG: 143 | End: 2024-05-03
Payer: COMMERCIAL

## 2024-05-03 PROBLEM — J93.9 PNEUMOTHORAX ON RIGHT: Status: ACTIVE | Noted: 2024-05-03

## 2024-05-03 PROBLEM — J44.9 CHRONIC OBSTRUCTIVE PULMONARY DISEASE (HCC): Status: ACTIVE | Noted: 2024-05-03

## 2024-05-03 PROBLEM — F17.200 SMOKER: Status: ACTIVE | Noted: 2024-05-03

## 2024-05-03 PROBLEM — Z72.0 TOBACCO ABUSE: Status: ACTIVE | Noted: 2024-05-03

## 2024-05-03 LAB
ALBUMIN SERPL BCP-MCNC: 4.2 G/DL (ref 3.5–5)
ALP SERPL-CCNC: 62 U/L (ref 34–104)
ALT SERPL W P-5'-P-CCNC: 12 U/L (ref 7–52)
ANION GAP SERPL CALCULATED.3IONS-SCNC: 8 MMOL/L (ref 4–13)
AST SERPL W P-5'-P-CCNC: 15 U/L (ref 13–39)
ATRIAL RATE: 102 BPM
BASOPHILS # BLD AUTO: 0.06 THOUSANDS/ÂΜL (ref 0–0.1)
BASOPHILS NFR BLD AUTO: 1 % (ref 0–1)
BILIRUB SERPL-MCNC: 0.62 MG/DL (ref 0.2–1)
BUN SERPL-MCNC: 18 MG/DL (ref 5–25)
CALCIUM SERPL-MCNC: 9.2 MG/DL (ref 8.4–10.2)
CHLORIDE SERPL-SCNC: 105 MMOL/L (ref 96–108)
CO2 SERPL-SCNC: 24 MMOL/L (ref 21–32)
CREAT SERPL-MCNC: 0.84 MG/DL (ref 0.6–1.3)
EOSINOPHIL # BLD AUTO: 0.36 THOUSAND/ÂΜL (ref 0–0.61)
EOSINOPHIL NFR BLD AUTO: 5 % (ref 0–6)
ERYTHROCYTE [DISTWIDTH] IN BLOOD BY AUTOMATED COUNT: 13 % (ref 11.6–15.1)
GFR SERPL CREATININE-BSD FRML MDRD: 107 ML/MIN/1.73SQ M
GLUCOSE SERPL-MCNC: 100 MG/DL (ref 65–140)
HCT VFR BLD AUTO: 47.4 % (ref 36.5–49.3)
HGB BLD-MCNC: 15.5 G/DL (ref 12–17)
IMM GRANULOCYTES # BLD AUTO: 0.01 THOUSAND/UL (ref 0–0.2)
IMM GRANULOCYTES NFR BLD AUTO: 0 % (ref 0–2)
LYMPHOCYTES # BLD AUTO: 2.31 THOUSANDS/ÂΜL (ref 0.6–4.47)
LYMPHOCYTES NFR BLD AUTO: 30 % (ref 14–44)
MCH RBC QN AUTO: 30.8 PG (ref 26.8–34.3)
MCHC RBC AUTO-ENTMCNC: 32.7 G/DL (ref 31.4–37.4)
MCV RBC AUTO: 94 FL (ref 82–98)
MONOCYTES # BLD AUTO: 0.61 THOUSAND/ÂΜL (ref 0.17–1.22)
MONOCYTES NFR BLD AUTO: 8 % (ref 4–12)
NEUTROPHILS # BLD AUTO: 4.41 THOUSANDS/ÂΜL (ref 1.85–7.62)
NEUTS SEG NFR BLD AUTO: 56 % (ref 43–75)
NRBC BLD AUTO-RTO: 0 /100 WBCS
P AXIS: 151 DEGREES
PLATELET # BLD AUTO: 285 THOUSANDS/UL (ref 149–390)
PMV BLD AUTO: 8.9 FL (ref 8.9–12.7)
POTASSIUM SERPL-SCNC: 4.3 MMOL/L (ref 3.5–5.3)
PR INTERVAL: 152 MS
PROT SERPL-MCNC: 7 G/DL (ref 6.4–8.4)
QRS AXIS: 81 DEGREES
QRSD INTERVAL: 96 MS
QT INTERVAL: 328 MS
QTC INTERVAL: 427 MS
RBC # BLD AUTO: 5.04 MILLION/UL (ref 3.88–5.62)
SARS-COV-2 RNA RESP QL NAA+PROBE: NEGATIVE
SODIUM SERPL-SCNC: 137 MMOL/L (ref 135–147)
T WAVE AXIS: 98 DEGREES
VENTRICULAR RATE: 102 BPM
WBC # BLD AUTO: 7.76 THOUSAND/UL (ref 4.31–10.16)

## 2024-05-03 PROCEDURE — 93010 ELECTROCARDIOGRAM REPORT: CPT | Performed by: INTERNAL MEDICINE

## 2024-05-03 PROCEDURE — 99255 IP/OBS CONSLTJ NEW/EST HI 80: CPT

## 2024-05-03 PROCEDURE — 71045 X-RAY EXAM CHEST 1 VIEW: CPT

## 2024-05-03 PROCEDURE — 85025 COMPLETE CBC W/AUTO DIFF WBC: CPT | Performed by: PHYSICIAN ASSISTANT

## 2024-05-03 PROCEDURE — NC001 PR NO CHARGE

## 2024-05-03 PROCEDURE — 99232 SBSQ HOSP IP/OBS MODERATE 35: CPT | Performed by: FAMILY MEDICINE

## 2024-05-03 PROCEDURE — 80053 COMPREHEN METABOLIC PANEL: CPT | Performed by: PHYSICIAN ASSISTANT

## 2024-05-03 RX ORDER — HYDROMORPHONE HCL/PF 1 MG/ML
1 SYRINGE (ML) INJECTION EVERY 4 HOURS PRN
Status: DISCONTINUED | OUTPATIENT
Start: 2024-05-03 | End: 2024-05-05 | Stop reason: HOSPADM

## 2024-05-03 RX ORDER — LANOLIN ALCOHOL/MO/W.PET/CERES
3 CREAM (GRAM) TOPICAL
Status: DISCONTINUED | OUTPATIENT
Start: 2024-05-03 | End: 2024-05-05 | Stop reason: HOSPADM

## 2024-05-03 RX ORDER — LANOLIN ALCOHOL/MO/W.PET/CERES
3 CREAM (GRAM) TOPICAL ONCE
Status: COMPLETED | OUTPATIENT
Start: 2024-05-03 | End: 2024-05-03

## 2024-05-03 RX ORDER — FENTANYL CITRATE 50 UG/ML
25 INJECTION, SOLUTION INTRAMUSCULAR; INTRAVENOUS EVERY 4 HOURS PRN
Status: DISCONTINUED | OUTPATIENT
Start: 2024-05-03 | End: 2024-05-04

## 2024-05-03 RX ADMIN — HYDROMORPHONE HYDROCHLORIDE 1 MG: 1 INJECTION, SOLUTION INTRAMUSCULAR; INTRAVENOUS; SUBCUTANEOUS at 15:05

## 2024-05-03 RX ADMIN — FENTANYL CITRATE 25 MCG: 50 INJECTION, SOLUTION INTRAMUSCULAR; INTRAVENOUS at 13:15

## 2024-05-03 RX ADMIN — FENTANYL CITRATE 25 MCG: 50 INJECTION, SOLUTION INTRAMUSCULAR; INTRAVENOUS at 07:31

## 2024-05-03 RX ADMIN — Medication 3 MG: at 01:06

## 2024-05-03 RX ADMIN — HEPARIN SODIUM 5000 UNITS: 5000 INJECTION, SOLUTION INTRAVENOUS; SUBCUTANEOUS at 14:36

## 2024-05-03 RX ADMIN — HYDROMORPHONE HYDROCHLORIDE 1 MG: 1 INJECTION, SOLUTION INTRAMUSCULAR; INTRAVENOUS; SUBCUTANEOUS at 19:40

## 2024-05-03 RX ADMIN — SODIUM CHLORIDE 125 ML/HR: 0.9 INJECTION, SOLUTION INTRAVENOUS at 07:38

## 2024-05-03 RX ADMIN — Medication 3 MG: at 21:04

## 2024-05-03 RX ADMIN — HEPARIN SODIUM 5000 UNITS: 5000 INJECTION, SOLUTION INTRAVENOUS; SUBCUTANEOUS at 21:04

## 2024-05-03 RX ADMIN — FENTANYL CITRATE 25 MCG: 50 INJECTION, SOLUTION INTRAMUSCULAR; INTRAVENOUS at 17:08

## 2024-05-03 RX ADMIN — HEPARIN SODIUM 5000 UNITS: 5000 INJECTION, SOLUTION INTRAVENOUS; SUBCUTANEOUS at 06:03

## 2024-05-03 RX ADMIN — HYDROMORPHONE HYDROCHLORIDE 1 MG: 1 INJECTION, SOLUTION INTRAMUSCULAR; INTRAVENOUS; SUBCUTANEOUS at 00:46

## 2024-05-03 RX ADMIN — HYDROMORPHONE HYDROCHLORIDE 1 MG: 1 INJECTION, SOLUTION INTRAMUSCULAR; INTRAVENOUS; SUBCUTANEOUS at 06:03

## 2024-05-03 RX ADMIN — HYDROMORPHONE HYDROCHLORIDE 1 MG: 1 INJECTION, SOLUTION INTRAMUSCULAR; INTRAVENOUS; SUBCUTANEOUS at 11:13

## 2024-05-03 RX ADMIN — FENTANYL CITRATE 25 MCG: 50 INJECTION, SOLUTION INTRAMUSCULAR; INTRAVENOUS at 21:03

## 2024-05-03 NOTE — PROGRESS NOTES
"[facility]  [facility]  Name: Marcus Barrera I  MRN: [id number]  Unit/Bed#:  I Date of Admission: 5/2/2024   Date of Service: 5/3/2024 I Hospital Day: 1    Assessment/Plan   * Primary spontaneous pneumothorax  Assessment & Plan  Presented to the ER with complaints of right-sided chest pain   Patient reports being persistent over the past 2 days with radiation to right shoulder and his neck  CT shows-Large right hydropneumothorax with collapse of the entire right lobe. No significant mediastinal shift to suggest tension.     Chest tube placed in ED, repeat CXR shows re-expansion. Repeat CXR today improving   Pulm consult   Surgical consult for CT management over the weekend     Smoker  Assessment & Plan  He's considering smoking cessation              Progress Note - Marcus Barrera 44 y.o. male MRN: [id number]    Unit/Bed#:  Encounter: [id number]        Subjective:   Patient seen and examined, feels well. Pain is controlled, he's taking deep breaths  Will consider smoking cessation     Objective:     Vitals:   Vitals:    05/03/24 1000   BP: 127/88   Pulse: 77   Resp: 17   Temp:    SpO2: 97%     Body mass index is 24.6 kg/m².    Intake/Output Summary (Last 24 hours) at 5/3/2024 1058  Last data filed at 5/3/2024 1001  Gross per 24 hour   Intake 2047.08 ml   Output 300 ml   Net 1747.08 ml       Physical Exam:   /88   Pulse 77   Temp (!) 97 °F (36.1 °C) (Temporal)   Resp 17   Ht 6' 2\" (1.88 m)   Wt 86.9 kg (191 lb 9.3 oz)   SpO2 97%   BMI 24.60 kg/m²   General appearance: alert and oriented, in no acute distress  Lungs: clear to auscultation bilaterally  Heart: regular rate and rhythm, S1, S2 normal, no murmur, click, rub or gallop  Abdomen: soft, non-tender; bowel sounds normal; no masses,  no organomegaly  Extremities: extremities normal, warm and well-perfused; no cyanosis, clubbing, or edema  Pulses: 2+ and symmetric  Neurologic: Grossly normal     Invasive " Devices       Peripheral Intravenous Line  Duration             Peripheral IV 05/02/24 Right;Ventral (anterior) Forearm <1 day              Drain  Duration             Chest Tube Right Fourth intercostal space <1 day                    Results from last 7 days   Lab Units 05/03/24  0603 05/02/24  1555   WBC Thousand/uL 7.76 9.10   HEMOGLOBIN g/dL 15.5 16.4   HEMATOCRIT % 47.4 48.9   PLATELETS Thousands/uL 285 311       Results from last 7 days   Lab Units 05/03/24  0603 05/02/24  1555   POTASSIUM mmol/L 4.3 3.8   CHLORIDE mmol/L 105 105   CO2 mmol/L 24 26   BUN mg/dL 18 15   CREATININE mg/dL 0.84 0.91   CALCIUM mg/dL 9.2 9.7   ALK PHOS U/L 62 65   ALT U/L 12 16   AST U/L 15 16       Medication Administration - last 24 hours from 05/02/2024 1058 to 05/03/2024 1058         Date/Time Order Dose Route Action Action by     05/02/2024 1723 EDT lidocaine-epinephrine (XYLOCAINE/EPINEPHRINE) 1 %-1:100,000 injection 10 mL 10 mL Infiltration Given by Other [facility]     05/02/2024 1724 EDT midazolam (VERSED) injection 2 mg 2 mg Intravenous Given [facility]     05/02/2024 1724 EDT Ketamine HCl 23 mg 23 mg Intravenous Given [facility]     05/02/2024 1836 EDT ketorolac (TORADOL) injection 15 mg 15 mg Intravenous Given [facility]     05/03/2024 0854 EDT nicotine (NICODERM CQ) 21 mg/24 hr TD 24 hr patch 1 patch 1 patch Transdermal Not Given Arely Orellana RN     05/03/2024 0603 EDT heparin (porcine) subcutaneous injection 5,000 Units 5,000 Units Subcutaneous Given Sarai Vazquez RN     05/02/2024 2200 EDT heparin (porcine) subcutaneous injection 5,000 Units 5,000 Units Subcutaneous Given Jo Ann Butcher RN     05/03/2024 1057 EDT sodium chloride 0.9 % infusion 0 mL/hr Intravenous Stopped Arely Orellana RN     05/03/2024 0738 EDT sodium chloride 0.9 % infusion 125 mL/hr Intravenous New Bag Arely Orellana RN     05/02/2024 1836 EDT sodium chloride 0.9 % infusion 125 mL/hr Intravenous New Bag Kate  JANA Elizalde     05/02/2024 1952 EDT fentaNYL injection 50 mcg -- Intravenous MAR Unhold Fransisco Loya PA-C     05/02/2024 1950 EDT fentaNYL injection 50 mcg 50 mcg Intravenous Given Jo Ann Butcher RN     05/02/2024 1936 EDT fentaNYL injection 50 mcg -- Intravenous MAR Hold Automatic Transfer Provider     05/02/2024 2139 EDT HYDROmorphone (DILAUDID) injection 0.5 mg 0.5 mg Intravenous Given Jo Ann Butcher RN     05/03/2024 0731 EDT fentaNYL injection 25 mcg 25 mcg Intravenous Given Arely Orellana RN     05/03/2024 0603 EDT HYDROmorphone (DILAUDID) injection 1 mg 1 mg Intravenous Given Sarai Vazquez RN     05/03/2024 0046 EDT HYDROmorphone (DILAUDID) injection 1 mg 1 mg Intravenous Given Jo Ann Butcher RN     05/03/2024 0106 EDT melatonin tablet 3 mg 3 mg Oral Given Jo Ann Butcher RN              Lab, Imaging and other studies: I have personally reviewed pertinent reports.    VTE Pharmacologic Prophylaxis: Heparin  VTE Mechanical Prophylaxis: sequential compression device     Alonzo Nicholson MD  5/3/2024,10:58 AM

## 2024-05-03 NOTE — PLAN OF CARE
Problem: PAIN - ADULT  Goal: Verbalizes/displays adequate comfort level or baseline comfort level  Description: Interventions:  - Encourage patient to monitor pain and request assistance  - Assess pain using appropriate pain scale  - Administer analgesics based on type and severity of pain and evaluate response  - Implement non-pharmacological measures as appropriate and evaluate response  - Consider cultural and social influences on pain and pain management  - Notify physician/advanced practitioner if interventions unsuccessful or patient reports new pain  Outcome: Progressing     Problem: INFECTION - ADULT  Goal: Absence or prevention of progression during hospitalization  Description: INTERVENTIONS:  - Assess and monitor for signs and symptoms of infection  - Monitor lab/diagnostic results  - Monitor all insertion sites, i.e. indwelling lines, tubes, and drains  - Monitor endotracheal if appropriate and nasal secretions for changes in amount and color  - [place] appropriate cooling/warming therapies per order  - Administer medications as ordered  - Instruct and encourage patient and family to use good hand hygiene technique  - Identify and instruct in appropriate isolation precautions for identified infection/condition  Outcome: Progressing     Problem: Knowledge Deficit  Goal: Patient/family/caregiver demonstrates understanding of disease process, treatment plan, medications, and discharge instructions  Description: Complete learning assessment and assess knowledge base.  Interventions:  - Provide teaching at level of understanding  - Provide teaching via preferred learning methods  Outcome: Progressing     Problem: RESPIRATORY - ADULT  Goal: Achieves optimal ventilation and oxygenation  Description: INTERVENTIONS:  - Assess for changes in respiratory status  - Assess for changes in mentation and behavior  - Position to facilitate oxygenation and minimize respiratory effort  - Oxygen administered by  appropriate delivery if ordered  - Initiate smoking cessation education as indicated  - Encourage broncho-pulmonary hygiene including cough, deep breathe, Incentive Spirometry  - Assess the need for suctioning and aspirate as needed  - Assess and instruct to report SOB or any respiratory difficulty  - Respiratory Therapy support as indicated  Outcome: Progressing

## 2024-05-03 NOTE — UTILIZATION REVIEW
Initial Clinical Review    Admission: Date/Time/Statement:   Admission Orders (From admission, onward)       Ordered        05/02/24 1827  INPATIENT ADMISSION  Once                          Orders Placed This Encounter   Procedures    INPATIENT ADMISSION     Standing Status:   Standing     Number of Occurrences:   1     Order Specific Question:   Level of Care     Answer:   Med Surg [16]     Order Specific Question:   Estimated length of stay     Answer:   More than 2 Midnights     Order Specific Question:   Certification     Answer:   I certify that inpatient services are medically necessary for this patient for a duration of greater than two midnights. See H&P and MD Progress Notes for additional information about the patient's course of treatment.     ED Arrival Information       Expected   -    Arrival   5/2/2024 15:27    Acuity   Urgent              Means of arrival   Walk-In    Escorted by   Self    Service   Hospitalist    Admission type   Emergency              Arrival complaint   chest pain             Chief Complaint   Patient presents with    Chest Pain       Initial Presentation: 44 y.o. male presented to ED as inpatient admission for spontaneous right pneumothorax.  Patient states the pain onset approximately 2 days ago without any obvious etiology and has been persistent. The pain is localized to the right upper chest and is exacerbated with movement. Radiation to the right shoulder/neck. PMHx pneumothorax Exam benign, No subcutaneous emphysema. CT -Large right hydropneumothorax with collapse of the entire right lobe. No significant mediastinal shift to suggest tension.  Right chest tube place Plan chest tube -20 cm H2O cardio-pulmonary & pulse oximetry IVF, repeat CXR 05-03-24 and supportive care    Anticipated Length of Stay/Certification Statement:  Patient will be admitted on an inpatient basis with an anticipated length of stay of greater than 2 midnights secondary to primary spontaneous  pneumothorax, chest pain.       Date: 05-03-24   Day 2:  right chest tube remain-20cm H2O repeat CXR Persistent trace right apical pneumothorax. Remain right chest and repeat CXR 05-04-23      ED Triage Vitals   Temperature Pulse Respirations Blood Pressure SpO2   05/02/24 1535 05/02/24 1535 05/02/24 1535 05/02/24 1535 05/02/24 1535   (!) 97 °F (36.1 °C) (!) 108 18 151/97 96 %      Temp Source Heart Rate Source Patient Position - Orthostatic VS BP Location FiO2 (%)   05/02/24 1535 05/02/24 1535 05/02/24 1535 05/02/24 1535 --   Temporal Monitor Sitting Right arm       Pain Score       05/02/24 1536       6          Wt Readings from Last 1 Encounters:   05/02/24 86.9 kg (191 lb 9.3 oz)     Additional Vital Signs:     Date/Time Temp Pulse Resp BP MAP (mmHg) SpO2 Calculated FIO2 (%) - Nasal Cannula Nasal Cannula O2 Flow Rate (L/min) O2 Device Patient Position - Orthostatic VS   05/03/24 0800 -- 74 13 123/89 103 97 % -- -- -- --   05/03/24 0700 97 °F (36.1 °C) Abnormal  68 13 132/85 103 97 % -- -- -- --   05/03/24 0500 -- 93 23 Abnormal  124/89 103 97 % -- -- -- --   05/03/24 0400 -- 78 12 157/91 119 98 % -- -- -- --   05/03/24 0300 -- 74 12 142/96 116 -- -- -- -- --   05/03/24 0200 -- 73 12 140/86 107 97 % -- -- -- --   05/03/24 0100 -- 93 9 Abnormal  134/97 112 97 % -- -- -- --   05/03/24 0000 -- 96 19 130/87 104 97 % -- -- -- --   05/02/24 2300 -- 85 14 139/94 113 97 % -- -- -- --   05/02/24 2200 -- 90 21 138/104 Abnormal  118 97 % -- -- -- --   05/02/24 2100 -- 90 20 129/74 96 97 % -- -- -- --   05/02/24 2000 -- 100 21 149/113 Abnormal  127 98 % -- -- -- --   05/02/24 1950 -- 90 24 Abnormal  155/114 Abnormal  132 99 % -- -- None (Room air) Lying   05/02/24 1938 98.3 °F (36.8 °C) -- -- -- -- -- -- -- None (Room air) --   05/02/24 19:05:26 98 °F (36.7 °C) 100 17 156/111 Abnormal  126 95 % -- -- -- --   05/02/24 1807 -- -- -- -- -- -- -- -- None (Room air) --   05/02/24 1754 -- 121 Abnormal  27 Abnormal  172/89 Abnormal   121 97 % -- -- -- --   05/02/24 1752 -- -- -- 166/92 119 -- -- -- -- --   05/02/24 1751 -- 122 Abnormal  26 Abnormal  -- -- 96 % -- -- -- --   05/02/24 1750 97 °F (36.1 °C) Abnormal  123 Abnormal  96 Abnormal  154/82 109 96 % 28 2 L/min Nasal cannula Lying   05/02/24 1748 -- 127 Abnormal  26 Abnormal  149/74 106 94 % -- -- -- --   05/02/24 1746 -- -- -- 173/84 Abnormal  122 -- -- -- -- --   05/02/24 1745 97 °F (36.1 °C) Abnormal  133 Abnormal  19 189/99 Abnormal  122 94 % 28 2 L/min Nasal cannula  Lying   O2 Device: 2L at 05/02/24 1745 05/02/24 1742 -- 139 Abnormal  17 193/129 Abnormal  155 94 % -- -- -- --   05/02/24 1740 97 °F (36.1 °C) Abnormal  135 Abnormal  16 179/116 Abnormal  138 94 % 28 2 L/min Nasal cannula Lying   05/02/24 1739 -- 138 Abnormal  25 Abnormal  -- -- 95 % -- -- -- --   05/02/24 1736 -- 137 Abnormal  30 Abnormal  175/112 Abnormal  138 95 % -- -- -- --   05/02/24 1735 97 °F (36.1 °C) Abnormal  133 Abnormal  18 170/112 Abnormal  135 96 % 28 2 L/min Nasal cannula Lying   05/02/24 1734 -- -- -- 170/112 Abnormal  135 -- -- -- -- --   05/02/24 1733 -- 131 Abnormal  30 Abnormal  -- -- 96 % -- -- -- --   05/02/24 1730 -- 119 Abnormal  28 Abnormal  193/128 Abnormal  155 96 % -- -- -- --   05/02/24 17:29:54 -- -- -- -- -- -- -- -- None (Room air) --   05/02/24 1728 -- -- -- 167/119 Abnormal  136 -- -- -- -- --   05/02/24 1727 -- 119 Abnormal  22 -- -- 93 % -- -- -- --           Pertinent Labs/Diagnostic Test Results:     EKG 05-02-24  ECG rate:  102    ECG rate assessment: tachycardic    Rhythm:     Rhythm: sinus rhythm    Ectopy:     Ectopy: none    QRS:     QRS axis:  Normal    QRS intervals:  Normal  Conduction:     Conduction: abnormal      Abnormal conduction: incomplete RBBB    ST segments:     ST segments:  Non-specific    XR chest portable   ED Interpretation by Jonathan Jacobs DO (05/02 1814)   Postprocedure x-ray.  12 Spanish chest tube in place.  Note lung reexpansion.      CT chest  without contrast   Final Result by Bennie Hill MD (05/02 1724)      Large right hydropneumothorax with collapse of the entire right lobe. No significant mediastinal shift to suggest tension.      Emphysema. No underlying cystic lung disease.                     Workstation performed: [id number]         XR chest 2 views   ED Interpretation by Jonathan Jacobs DO (05/02 1637)   Large right-sided pneumothorax noted.      Final Result by Bennie Hill MD (05/02 1725)      Large right pneumothorax with near complete collapse of the right lung. No significant mediastinal shift.         Findings concur with preliminary interpretation documented by Dr. Jacobs in the emergency room.      Workstation performed: [id number]         XR chest portable    (Results Pending)     Results from last 7 days   Lab Units 05/02/24 2013   SARS-COV-2  Negative     Results from last 7 days   Lab Units 05/03/24  0603 05/02/24  1555   WBC Thousand/uL 7.76 9.10   HEMOGLOBIN g/dL 15.5 16.4   HEMATOCRIT % 47.4 48.9   PLATELETS Thousands/uL 285 311   TOTAL NEUT ABS Thousands/µL 4.41 5.43         Results from last 7 days   Lab Units 05/03/24  0603 05/02/24  1555   SODIUM mmol/L 137 139   POTASSIUM mmol/L 4.3 3.8   CHLORIDE mmol/L 105 105   CO2 mmol/L 24 26   ANION GAP mmol/L 8 8   BUN mg/dL 18 15   CREATININE mg/dL 0.84 0.91   EGFR ml/min/1.73sq m 107 102   CALCIUM mg/dL 9.2 9.7   MAGNESIUM mg/dL  --  2.2   PHOSPHORUS mg/dL  --  3.4     Results from last 7 days   Lab Units 05/03/24  0603 05/02/24  1555   AST U/L 15 16   ALT U/L 12 16   ALK PHOS U/L 62 65   TOTAL PROTEIN g/dL 7.0 7.9   ALBUMIN g/dL 4.2 4.8   TOTAL BILIRUBIN mg/dL 0.62 0.57         Results from last 7 days   Lab Units 05/03/24  0603 05/02/24  1555   GLUCOSE RANDOM mg/dL 100 101     Results from last 7 days   Lab Units 05/02/24  1802 05/02/24  1555   HS TNI 0HR ng/L  --  3   HS TNI 2HR ng/L <2  --    HSTNI D2 ng/L <-1  --            ED Treatment:   Medication  Administration from 05/02/2024 1527 to 05/02/2024 1857         Date/Time Order Dose Route Action     05/02/2024 1723 EDT lidocaine-epinephrine (XYLOCAINE/EPINEPHRINE) 1 %-1:100,000 injection 10 mL 10 mL Infiltration Given by Other     05/02/2024 1724 EDT midazolam (VERSED) injection 2 mg 2 mg Intravenous Given     05/02/2024 1724 EDT Ketamine HCl 23 mg 23 mg Intravenous Given     05/02/2024 1836 EDT ketorolac (TORADOL) injection 15 mg 15 mg Intravenous Given     05/02/2024 1836 EDT sodium chloride 0.9 % infusion 125 mL/hr Intravenous New Bag          Past Medical History:   Diagnosis Date    Collapsed lung 8 years ago    chest tube placement    Collapsed lung     Renal disorder      Present on Admission:   Primary spontaneous pneumothorax   Other chest pain   Smoker      Admitting Diagnosis: Chest pain [R07.9]  Pneumothorax on right [J93.9]  Age/Sex: 44 y.o. male    Admission Orders:  Right chest tube -20cm H2O  Continuous cardio-pulmonary & pulse oximetry  PT/OT  SCD    Scheduled Medications:  heparin (porcine), 5,000 Units, Subcutaneous, Q8H JOSR  nicotine, 1 patch, Transdermal, Daily      Continuous IV Infusions:  sodium chloride, 125 mL/hr, Intravenous, Continuous      PRN Meds:  fentaNYL, 25 mcg, Intravenous, Q4H PRN  HYDROmorphone, 1 mg, Intravenous, Q4H PRN        IP CONSULT TO PULMONOLOGY    Network Utilization Review Department  ATTENTION: Please call with any questions or concerns to [phone] and carefully listen to the prompts so that you are directed to the right person. All voicemails are confidential.   For Discharge needs, contact Care Management DC Support Team at [phone] opt. [phone]  Send all requests for admission clinical reviews, approved or denied determinations and any other requests to dedicated fax number below belonging to the campus where the patient is receiving treatment. List of dedicated fax numbers for the Facilities:  FACILITY NAME UR FAX NUMBER   ADMISSION DENIALS  (Administrative/Medical Necessity) [phone]   DISCHARGE SUPPORT TEAM (NETWORK) [phone]   PARENT CHILD HEALTH (Maternity/NICU/Pediatrics) [phone]   [facility] [phone]   [facility] [phone]   [facility] [phone]   [facility] [phone]   [facility] [phone]   [facility] [phone]   [facility] [phone]   [facility] [phone]   [facility] [phone]   [facility] [phone]   [facility] [phone]   [facility] [phone]

## 2024-05-03 NOTE — CONSULTS
Consultation - General Surgery   Marcus Barrera 44 y.o. male MRN: [id number]  Unit/Bed#:  Encounter: [id number]    Assessment/Plan     Assessment:    44-year-old male smoker admitted through the ED with primary spontaneous right pneumothorax, chest tube was inserted in the ED.  No evidence of air leak, currently being maintained on waterseal.    Previous history of left pneumothorax about 12 years ago also treated with chest tube at that time.    Chronic smoker half pack per day.    CT chest shows large right hydropneumothorax with collapse of the entire right lobe but without mediastinal shift.    Portable chest x-ray post chest tube insertion was performed showed chest tube over medial right hemithorax with persistent trace right apical pneumothorax and mild right base atelectasis.  Normal cardiomediastinal silhouette.    CBC entirely normal.  White count 7.76.  CMP normal electrolytes and LFTs.  HS troponin at 0 and 2 hours normal    Vital signs reviewed, hemodynamically stable.  Temp 96.9.  SpO2 on room air 98%.    Plan:  Pulmonology consult was ordered by medicine  Keep chest tube to waterseal overnight  Monitor oxygen level  Repeat portable chest x-ray in a.m.  Analgesics as ordered as needed  Topical NicoDerm patch daily  Remainder of medical management per the attending hospitalist service  Tobacco cessation education prior to hospital discharge  Dr. Rosalinda Clarke is the on-call general surgeon covering this hospital over the weekend, she will evaluate the patient tomorrow to determine appropriate chest tube removal in the absence of pulmonology coverage here over the weekend.    History of Present Illness     HPI:  Marcus Barrera is a 44 y.o. male chronic smoker presented to the ED last evening here with chest pain and right upper shoulder pain about 2 days ago exacerbated with movement or deep inspiration.  He apparently was vaping THC 2 days ago and had bouts of coughing prior to onset of  his right shoulder pain and mild SOB.  No hx of any blunt trauma to chest or falls..Patient with a previous history of a left-sided chest pneumothorax about 8 years ago.  He smokes a half excess pack of cigarettes per day.  Denies any cough or hemoptysis.  Initial chest x-ray PA and lateral in the ED showed large right pneumothorax with near complete collapse of the right lung.  CT chest without IV contrast showed large right hydro pneumothorax with collapse of the entire right lobe without mediastinal shift.  Emphysematous changes noted.  Chest tube was inserted by the ED physician last evening, he also discussed via telephone with thoracic surgery in [place] and they did not think that tertiary transfer was warranted at that time.  Currently patient resting comfortably, denies shortness of breath.  Right chest tube currently on waterseal.  He has been maintaining normal oxygen levels on room air.    Inpatient consult to Acute Care Surgery  Consult performed by: Mayo Villanueva PA-C  Consult ordered by: Alonzo Nicholson MD        Review of Systems   Constitutional: Negative.    HENT: Negative.     Eyes: Negative.    Respiratory:  Positive for chest tightness and shortness of breath. Negative for cough and wheezing.    Cardiovascular:  Positive for chest pain.   Gastrointestinal:  Negative for abdominal pain, constipation, diarrhea, nausea and vomiting.   Endocrine: Negative.    Genitourinary: Negative.    Musculoskeletal:  Negative for back pain.   Skin:  Negative for pallor.   Allergic/Immunologic: Negative.    Neurological: Negative.  Negative for dizziness, syncope, weakness, light-headedness and headaches.   Hematological: Negative.    Psychiatric/Behavioral: Negative.     All other systems reviewed and are negative.      Historical Information   Past Medical History:   Diagnosis Date    Collapsed lung 8 years ago    chest tube placement    Collapsed lung     Renal disorder      History reviewed. No pertinent  "surgical history.  Social History   Social History     Substance and Sexual Activity   Alcohol Use Not Currently    Comment: occasional     Social History     Substance and Sexual Activity   Drug Use No     E-Cigarette/Vaping    E-Cigarette Use Current Some Day User      E-Cigarette/Vaping Substances    THC Yes      Social History     Tobacco Use   Smoking Status Every Day    Current packs/day: 1.00    Types: Cigarettes   Smokeless Tobacco Never     Family History: History reviewed. No pertinent family history.    Meds/Allergies   current meds:   Current Facility-Administered Medications   Medication Dose Route Frequency    fentaNYL injection 25 mcg  25 mcg Intravenous Q4H PRN    heparin (porcine) subcutaneous injection 5,000 Units  5,000 Units Subcutaneous Q8H JOSR    HYDROmorphone (DILAUDID) injection 1 mg  1 mg Intravenous Q4H PRN    nicotine (NICODERM CQ) 21 mg/24 hr TD 24 hr patch 1 patch  1 patch Transdermal Daily     No Known Allergies    Objective   First Vitals:   Blood Pressure: 151/97 (05/02/24 1535)  Pulse: (!) 108 (05/02/24 1535)  Temperature: (!) 97 °F (36.1 °C) (05/02/24 1535)  Temp Source: Temporal (05/02/24 1535)  Respirations: 18 (05/02/24 1535)  Height: 6' 2\" (188 cm) (05/02/24 1536)  Weight - Scale: 90.7 kg (200 lb) (05/02/24 1536)  SpO2: 96 % (05/02/24 1535)    Current Vitals:   Blood Pressure: 140/92 (05/03/24 1410)  Pulse: 70 (05/03/24 1410)  Temperature: (!) 96.9 °F (36.1 °C) (05/03/24 1410)  Temp Source: Temporal (05/03/24 1410)  Respirations: 14 (05/03/24 1410)  Height: 6' 2\" (188 cm) (05/02/24 1905)  Weight - Scale: 86.9 kg (191 lb 9.3 oz) (05/02/24 1905)  SpO2: 98 % (05/03/24 1410)      Intake/Output Summary (Last 24 hours) at 5/3/2024 1434  Last data filed at 5/3/2024 1315  Gross per 24 hour   Intake 2287.08 ml   Output 700 ml   Net 1587.08 ml       Invasive Devices       Peripheral Intravenous Line  Duration             Peripheral IV 05/02/24 Right;Ventral (anterior) Forearm <1 day      "         Drain  Duration             Chest Tube Right Fourth intercostal space <1 day                    Physical Exam  Constitutional:       Appearance: He is not ill-appearing.   HENT:      Head: Atraumatic.      Nose: Nose normal.      Mouth/Throat:      Pharynx: Oropharynx is clear.   Eyes:      Conjunctiva/sclera: Conjunctivae normal.      Pupils: Pupils are equal, round, and reactive to light.   Cardiovascular:      Rate and Rhythm: Normal rate and regular rhythm.      Pulses: Normal pulses.      Heart sounds: No murmur heard.     No friction rub. No gallop.   Pulmonary:      Effort: No respiratory distress.      Breath sounds: No stridor. No wheezing, rhonchi or rales.      Comments: Chest symmetric, no respiratory distress.  Lung fields clear to auscultation.  Chest tube right side without evidence of air leak, currently on waterseal.  Breath sounds are equal.  Chest:      Chest wall: Tenderness present.   Abdominal:      General: Abdomen is flat. Bowel sounds are normal. There is no distension.      Palpations: There is no mass.      Tenderness: There is no abdominal tenderness. There is no guarding or rebound.      Hernia: No hernia is present.   Musculoskeletal:      Cervical back: No rigidity or tenderness.      Right lower leg: No edema.      Left lower leg: No edema.   Lymphadenopathy:      Cervical: No cervical adenopathy.   Skin:     General: Skin is warm.      Capillary Refill: Capillary refill takes less than 2 seconds.      Coloration: Skin is not pale.      Findings: No erythema or rash.   Neurological:      General: No focal deficit present.      Mental Status: He is alert and oriented to person, place, and time.      Motor: No weakness.      Coordination: Coordination normal.   Psychiatric:         Mood and Affect: Mood normal.         Thought Content: Thought content normal.       Lab Results: I have personally reviewed pertinent lab results.  , CBC:   Lab Results   Component Value Date    WBC  "7.76 05/03/2024    HGB 15.5 05/03/2024    HCT 47.4 05/03/2024    MCV 94 05/03/2024     05/03/2024    RBC 5.04 05/03/2024    MCH 30.8 05/03/2024    MCHC 32.7 05/03/2024    RDW 13.0 05/03/2024    MPV 8.9 05/03/2024    NRBC 0 05/03/2024   , CMP:   Lab Results   Component Value Date    SODIUM 137 05/03/2024    K 4.3 05/03/2024     05/03/2024    CO2 24 05/03/2024    BUN 18 05/03/2024    CREATININE 0.84 05/03/2024    CALCIUM 9.2 05/03/2024    AST 15 05/03/2024    ALT 12 05/03/2024    ALKPHOS 62 05/03/2024    EGFR 107 05/03/2024   , Coagulation: No results found for: \"PT\", \"INR\", \"APTT\"  Imaging: I have personally reviewed pertinent reports.      XR CHEST PORTABLE 5/3/2024     INDICATION: PTX.     COMPARISON: CXR and chest CT 5/2/2024.     FINDINGS:     Chest tube over medial mid right hemithorax with persistent trace right apical pneumothorax. Mild right base atelectasis.     Normal cardiomediastinal silhouette.     Bones are unremarkable for age.     Normal upper abdomen.     IMPRESSION:     Persistent trace right apical pneumothorax.    CT CHEST WITHOUT IV CONTRAST 5/2/2024     INDICATION: Chest pain for 2 days. Chest x-ray demonstrated a right-sided PTX. Smoker.     COMPARISON: CT scan from 6/11/2013 and chest x-ray from earlier today.     TECHNIQUE: CT examination of the chest was performed without intravenous contrast. Multiplanar 2D reformatted images were created from the source data.     This examination, like all CT scans performed in the [facility], was performed utilizing techniques to minimize radiation dose exposure, including the use of iterative reconstruction and automated exposure control. Radiation dose length   product (DLP) for this visit: 322.88 mGy-cm     FINDINGS:     LUNGS: Complete collapse of the right lung posteriorly and medially due to a large pneumothorax. No significant mediastinal shift to suggest tension. Background emphysema noted throughout both lungs. No " endotracheal or endobronchial lesion.     PLEURA: Large right hydropneumothorax with trace fluid posteriorly.     HEART/GREAT VESSELS: Heart is unremarkable for patient's age. No thoracic aortic aneurysm.     MEDIASTINUM AND MADDIE: Unremarkable.     CHEST WALL AND LOWER NECK: Unremarkable.     VISUALIZED STRUCTURES IN THE UPPER ABDOMEN: Unremarkable.     OSSEOUS STRUCTURES: No acute fracture or destructive osseous lesion. Stable benign-appearing sclerotic lesion in the right scapula.     IMPRESSION:     Large right hydropneumothorax with collapse of the entire right lobe. No significant mediastinal shift to suggest tension.     Emphysema. No underlying cystic lung disease.    EKG, Pathology, and Other Studies: I have personally reviewed pertinent reports.      Counseling / Coordination of Care  Total floor / unit time spent today 40 minutes.  Greater than 50% of total time was spent with the patient and / or family counseling and / or coordination of care.  A description of the counseling / coordination of care: Review of diagnostic imaging laboratory studies, personal examination patient, review existing orders and discussion with the primary attending hospitalist physician all conducted by this provider in the general surgeon evaluation of the patient at this time.    Mayo Villanueva PA-C

## 2024-05-03 NOTE — CASE MANAGEMENT
Case Management Discharge Planning Note    Patient name Marcus Barrera  Location / MRN [id number]  : 1980 Date 5/3/2024       Current Admission Date: 2024  Current Admission Diagnosis:Primary spontaneous pneumothorax   Patient Active Problem List    Diagnosis Date Noted    Smoker 2024    Primary spontaneous pneumothorax 2024    Other chest pain 2024      LOS (days): 1  Geometric Mean LOS (GMLOS) (days):   Days to GMLOS:     OBJECTIVE:  Risk of Unplanned Readmission Score: 5.93         Current admission status: Inpatient   Preferred Pharmacy:   [facility] [place] - [address], [address]  [address]  [facility] [address]  Phone: [phone] Fax: [phone]    [facility] [address]  [address]  [address] [place] [address]  Phone: [phone] Fax: [phone]    [id number] [place] - [address]  [address] [facility] [address]  Phone: [phone] Fax: [phone]    Primary Care Provider: No primary care provider on file.    Primary Insurance:   Secondary Insurance:     DISCHARGE DETAILS:  Chart review done. Plans at this time are home on dc with OP follow up. Pt does not have a PCP; refer to [facility] after dc and pt also has no insurance-PATH's to see pt re: Medical assistance. Will need to take into consideration medications on dc.

## 2024-05-03 NOTE — ASSESSMENT & PLAN NOTE
Presented to the ER with complaints of right-sided chest pain   Patient reports being persistent over the past 2 days with radiation to right shoulder and his neck  CT shows-Large right hydropneumothorax with collapse of the entire right lobe. No significant mediastinal shift to suggest tension.     Chest tube placed in ED, repeat CXR shows re-expansion. Repeat CXR today improving   Pulm consult   Surgical consult for CT management over the weekend

## 2024-05-03 NOTE — PHYSICAL THERAPY NOTE
PHYSICAL THERAPY        Patient Name: Marcus BATES Bruce  Today's Date: 5/3/2024       05/03/24 1220   PT Last Visit   PT Visit Date 05/03/24   Note Type   Note type Cancelled Session   Cancel Reasons Medical status     Candy Gross

## 2024-05-03 NOTE — CONSULTS
Consultation - Pulmonary Medicine   Marcus Barrera 44 y.o. male MRN: [id number]  Unit/Bed#:  Encounter: [id number]      Assessment/Plan:    Spontaneous right pneumothorax  Paraseptal emphysema without exacerbation  Nicotine dependence   Personal history of vaping THC and nicotine    Pulmonary discussion/recommendations:     Could be spontaneous PTX vs secondary to coughing fit in the setting of emphysema.   S/p right anterior 12 Fr chest tube placed in the ED 5/2.   Today, CXR noted to be stable and chest tube placed to water seal with plan to repeat CXR at approximately 1430 which was done and remained stable.   Continue chest tube to water seal through the night  Repeat CXR tomorrow AM   Maintain SpO2 greater than or equal to 90%.   General surgery consulted as well to help manage chest tube over the weekend. Hopeful removal in the next 24 hours pending clinical course and radiographs.   Discussed smoking and vaping cessation and patient was receptive of our conversation. NRT while inpatient.   Would benefit from patient pulmonary follow-up with plans to repeat CT chest in a few weeks along with PFTs to further assess mild paraseptal emphysema.     History of Present Illness   Physician Requesting Consult: Alonzo Nicholson MD  Reason for Consult / Principal Problem: PTX  Hx and PE limited by: n/a  Chief Complaint: chest pain  HPI: Marcus Barrera is a 44 y.o.  male who presented to [facility] with complaints of chest pain.  Patient has past medical history positive for nicotine dependence and vaping THC  As well as remote history of spontaneous left pneumothorax 12 years ago.  Patient presents to the ED yesterday after reporting complaints of having severe coughing for after vaping followed by anterior right chest pain.  Having had a previous pneumothorax he states that it felt similar but not as severe prompting him to seek further medical attention.  In the ED was confirmed by  chest x-ray and CT that he had a large right hydropneumothorax requiring chest tube placement with 12 [place] placed anteriorly by ED provider.  Dwaine was consulted to help manage this.  This morning, patient is seen on room air and is pleasant.  Denies acute complaints currently.    From a pulmonary standpoint, patient does not have any chronic lung disease.  As mentioned above he had a remote spontaneous left pneumothorax 12 years ago was managed with chest tube.  He did not require CT surgery.  He is a current everyday smoker stating that he smokes a pack a day on average since the age of 13.  Also admits to vaping THC and former history of illicit drug use with methamphetamines 10 years ago.  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     Inpatient consult to Pulmonology  Consult performed by: Eben Martínez PA-C  Consult ordered by: Alonzo Nicholson MD          Review of Systems   All other systems reviewed and are negative.      Historical Information   Past Medical History:   Diagnosis Date    Collapsed lung 8 years ago    chest tube placement    Collapsed lung     Renal disorder      History reviewed. No pertinent surgical history.  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 No pertinent family history.    Meds/Allergies  "  all current active meds have been reviewed, pertinent pulmonary meds have been reviewed, and current meds:   Current Facility-Administered Medications   Medication Dose Route Frequency    fentaNYL injection 25 mcg  25 mcg Intravenous Q4H PRN    heparin (porcine) subcutaneous injection 5,000 Units  5,000 Units Subcutaneous Q8H JOSR    HYDROmorphone (DILAUDID) injection 1 mg  1 mg Intravenous Q4H PRN    nicotine (NICODERM CQ) 21 mg/24 hr TD 24 hr patch 1 patch  1 patch Transdermal Daily       No Known Allergies    Objective   Vitals: Blood pressure 140/92, pulse 70, temperature (!) 96.9 °F (36.1 °C), temperature source Temporal, resp. rate 14, height 6' 2\" (1.88 m), weight 86.9 kg (191 lb 9.3 oz), SpO2 98%.room air,Body mass index is 24.6 kg/m².    Intake/Output Summary (Last 24 hours) at 5/3/2024 1545  Last data filed at 5/3/2024 1315  Gross per 24 hour   Intake 2287.08 ml   Output 700 ml   Net 1587.08 ml     Invasive Devices       Peripheral Intravenous Line  Duration             Peripheral IV 05/02/24 Right;Ventral (anterior) Forearm <1 day              Drain  Duration             Chest Tube Right Fourth intercostal space <1 day                    Physical Exam  Vitals and nursing note reviewed.   Constitutional:       General: He is not in acute distress.     Appearance: Normal appearance.   HENT:      Head: Normocephalic and atraumatic.      Mouth/Throat:      Mouth: Mucous membranes are moist.      Pharynx: Oropharynx is clear.   Cardiovascular:      Rate and Rhythm: Normal rate and regular rhythm.      Heart sounds: No murmur heard.  Pulmonary:      Breath sounds: No wheezing.      Comments: Chest tube in place in right hemithorax. Diminished breath sounds in right base.  Musculoskeletal:      Cervical back: Normal range of motion.   Skin:     General: Skin is warm and dry.   Neurological:      General: No focal deficit present.      Mental Status: He is alert. Mental status is at baseline.   Psychiatric:    " "     Mood and Affect: Mood normal.         Behavior: Behavior normal.         Lab Results: I have personally reviewed pertinent lab results., ABG: No results found for: \"PHART\", \"EZB4WBY\", \"PO2ART\", \"JMS7ZZJ\", \"R7MXAEDK\", \"BEART\", \"SOURCE\", BNP: No results found for: \"BNP\", CBC:   Lab Results   Component Value Date    WBC 7.76 05/03/2024    HGB 15.5 05/03/2024    HCT 47.4 05/03/2024    MCV 94 05/03/2024     05/03/2024    RBC 5.04 05/03/2024    MCH 30.8 05/03/2024    MCHC 32.7 05/03/2024    RDW 13.0 05/03/2024    MPV 8.9 05/03/2024    NRBC 0 05/03/2024   , CMP:   Lab Results   Component Value Date    SODIUM 137 05/03/2024    K 4.3 05/03/2024     05/03/2024    CO2 24 05/03/2024    BUN 18 05/03/2024    CREATININE 0.84 05/03/2024    CALCIUM 9.2 05/03/2024    AST 15 05/03/2024    ALT 12 05/03/2024    ALKPHOS 62 05/03/2024    EGFR 107 05/03/2024   , PT/INR: No results found for: \"PT\", \"INR\", Troponin: No results found for: \"TROPONINI\"    Imaging Studies: I have personally reviewed pertinent reports.   and I have personally reviewed pertinent films in PACS     CT chest without contrast 5/2/2024  Large right hydropneumothorax with collapse of the entire right lobe.  No significant mediastinal shift to suggest tension.  Mild paraseptal emphysema.    Chest x-ray 5/3/2024 on suction  Right-sided chest tube with appropriate placement.  Trace right apical pneumothorax.  Right base atelectasis noted.    Chest x-ray 5/3/2024 s/p water seal   Similar trace right apical pneumothorax and right base atelectasis per my interpretation    EKG, Pathology, and Other Studies: I have personally reviewed pertinent reports.       Pulmonary Results (PFTs, PSG): none to review      Code Status: Level 1 - Full Code    Portions of the record may have been created with voice recognition software.  Occasional wrong word or \"sound a like\" substitutions may have occurred due to the inherent limitations of voice recognition software.  Read " the chart carefully and recognize, using context, where substitutions have occurred.

## 2024-05-03 NOTE — PLAN OF CARE
Problem: PAIN - ADULT  Goal: Verbalizes/displays adequate comfort level or baseline comfort level  Description: Interventions:  - Encourage patient to monitor pain and request assistance  - Assess pain using appropriate pain scale  - Administer analgesics based on type and severity of pain and evaluate response  - Implement non-pharmacological measures as appropriate and evaluate response  - Consider cultural and social influences on pain and pain management  - Notify physician/advanced practitioner if interventions unsuccessful or patient reports new pain  Outcome: Progressing     Problem: INFECTION - ADULT  Goal: Absence or prevention of progression during hospitalization  Description: INTERVENTIONS:  - Assess and monitor for signs and symptoms of infection  - Monitor lab/diagnostic results  - Monitor all insertion sites, i.e. indwelling lines, tubes, and drains  - Monitor endotracheal if appropriate and nasal secretions for changes in amount and color  - [place] appropriate cooling/warming therapies per order  - Administer medications as ordered  - Instruct and encourage patient and family to use good hand hygiene technique  - Identify and instruct in appropriate isolation precautions for identified infection/condition  Outcome: Progressing     Problem: SAFETY ADULT  Goal: Patient will remain free of falls  Description: INTERVENTIONS:  - Educate patient/family on patient safety including physical limitations  - Instruct patient to call for assistance with activity   - Consult OT/PT to assist with strengthening/mobility   - Keep Call bell within reach  - Keep bed low and locked with side rails adjusted as appropriate  - Keep care items and personal belongings within reach  - Initiate and maintain comfort rounds  - Make Fall Risk Sign visible to staff  - Offer Toileting every 2 Hours, in advance of need  - Initiate/Maintain fall alarm as needed  - Obtain necessary fall risk management equipment: nonskid  footwear, call bell  - Apply yellow socks and bracelet for high fall risk patients  - Consider moving patient to room near nurses station  Outcome: Progressing  Goal: Maintain or return to baseline ADL function  Description: INTERVENTIONS:  -  Assess patient's ability to carry out ADLs; assess patient's baseline for ADL function and identify physical deficits which impact ability to perform ADLs (bathing, care of mouth/teeth, toileting, grooming, dressing, etc.)  - Assess/evaluate cause of self-care deficits   - Assess range of motion  - Assess patient's mobility; develop plan if impaired  - Assess patient's need for assistive devices and provide as appropriate  - Encourage maximum independence but intervene and supervise when necessary  - Involve family in performance of ADLs  - Assess for home care needs following discharge   - Consider OT consult to assist with ADL evaluation and planning for discharge  - Provide patient education as appropriate  Outcome: Progressing  Goal: Maintains/Returns to pre admission functional level  Description: INTERVENTIONS:  - Perform AM-PAC 6 Click Basic Mobility/ Daily Activity assessment daily.  - Set and communicate daily mobility goal to care team and patient/family/caregiver.   - Collaborate with rehabilitation services on mobility goals if consulted  - Perform Range of Motion 4 times a day.  - Reposition patient every 2 hours.  - Dangle patient 3 times a day  - Stand patient 3 times a day  - Ambulate patient 3 times a day  - Out of bed to chair 3 times a day   - Out of bed for meals 3 times a day  - Out of bed for toileting  - Record patient progress and toleration of activity level   Outcome: Progressing     Problem: DISCHARGE PLANNING  Goal: Discharge to home or other facility with appropriate resources  Description: INTERVENTIONS:  - Identify barriers to discharge w/patient and caregiver  - Arrange for needed discharge resources and transportation as appropriate  - Identify  discharge learning needs (meds, wound care, etc.)  - Arrange for interpretive services to assist at discharge as needed  - Refer to Case Management Department for coordinating discharge planning if the patient needs post-hospital services based on physician/advanced practitioner order or complex needs related to functional status, cognitive ability, or social support system  Outcome: Progressing     Problem: Knowledge Deficit  Goal: Patient/family/caregiver demonstrates understanding of disease process, treatment plan, medications, and discharge instructions  Description: Complete learning assessment and assess knowledge base.  Interventions:  - Provide teaching at level of understanding  - Provide teaching via preferred learning methods  Outcome: Progressing     Problem: RESPIRATORY - ADULT  Goal: Achieves optimal ventilation and oxygenation  Description: INTERVENTIONS:  - Assess for changes in respiratory status  - Assess for changes in mentation and behavior  - Position to facilitate oxygenation and minimize respiratory effort  - Oxygen administered by appropriate delivery if ordered  - Initiate smoking cessation education as indicated  - Encourage broncho-pulmonary hygiene including cough, deep breathe, Incentive Spirometry  - Assess the need for suctioning and aspirate as needed  - Assess and instruct to report SOB or any respiratory difficulty  - Respiratory Therapy support as indicated  Outcome: Progressing     Problem: SKIN/TISSUE INTEGRITY - ADULT  Goal: Incision(s), wounds(s) or drain site(s) healing without S/S of infection  Description: INTERVENTIONS  - Assess and document dressing, incision, wound bed, drain sites and surrounding tissue  - Provide patient and family education  - Perform skin care/dressing changes every day or as ordered  Outcome: Progressing

## 2024-05-03 NOTE — OCCUPATIONAL THERAPY NOTE
Occupational Therapy Cancellation Note     05/03/24 0752   OT Last Visit   OT Visit Date 05/03/24   Note Type   Note type Cancelled Session   Cancel Reasons Medical status   Additional Comments Per RN, requesting hold on therapy services on this date due to chest tube     OT orders received. Chart reviewed. Will continue to follow-up as able and appropriate  Adamaris Moreland MS, OTR/L

## 2024-05-04 ENCOUNTER — APPOINTMENT (INPATIENT)
Dept: RADIOLOGY | Facility: HOSPITAL | Age: 44
DRG: 143 | End: 2024-05-04
Payer: COMMERCIAL

## 2024-05-04 LAB
ALBUMIN SERPL BCP-MCNC: 4.1 G/DL (ref 3.5–5)
ALP SERPL-CCNC: 56 U/L (ref 34–104)
ALT SERPL W P-5'-P-CCNC: 13 U/L (ref 7–52)
ANION GAP SERPL CALCULATED.3IONS-SCNC: 6 MMOL/L (ref 4–13)
AST SERPL W P-5'-P-CCNC: 12 U/L (ref 13–39)
BASOPHILS # BLD AUTO: 0.05 THOUSANDS/ÂΜL (ref 0–0.1)
BASOPHILS NFR BLD AUTO: 1 % (ref 0–1)
BILIRUB SERPL-MCNC: 0.45 MG/DL (ref 0.2–1)
BUN SERPL-MCNC: 16 MG/DL (ref 5–25)
CALCIUM SERPL-MCNC: 9.2 MG/DL (ref 8.4–10.2)
CHLORIDE SERPL-SCNC: 104 MMOL/L (ref 96–108)
CO2 SERPL-SCNC: 29 MMOL/L (ref 21–32)
CREAT SERPL-MCNC: 0.87 MG/DL (ref 0.6–1.3)
EOSINOPHIL # BLD AUTO: 0.37 THOUSAND/ÂΜL (ref 0–0.61)
EOSINOPHIL NFR BLD AUTO: 6 % (ref 0–6)
ERYTHROCYTE [DISTWIDTH] IN BLOOD BY AUTOMATED COUNT: 12.6 % (ref 11.6–15.1)
GFR SERPL CREATININE-BSD FRML MDRD: 105 ML/MIN/1.73SQ M
GLUCOSE SERPL-MCNC: 94 MG/DL (ref 65–140)
HCT VFR BLD AUTO: 45.8 % (ref 36.5–49.3)
HGB BLD-MCNC: 14.9 G/DL (ref 12–17)
IMM GRANULOCYTES # BLD AUTO: 0.01 THOUSAND/UL (ref 0–0.2)
IMM GRANULOCYTES NFR BLD AUTO: 0 % (ref 0–2)
LYMPHOCYTES # BLD AUTO: 2.45 THOUSANDS/ÂΜL (ref 0.6–4.47)
LYMPHOCYTES NFR BLD AUTO: 38 % (ref 14–44)
MCH RBC QN AUTO: 30.5 PG (ref 26.8–34.3)
MCHC RBC AUTO-ENTMCNC: 32.5 G/DL (ref 31.4–37.4)
MCV RBC AUTO: 94 FL (ref 82–98)
MONOCYTES # BLD AUTO: 0.58 THOUSAND/ÂΜL (ref 0.17–1.22)
MONOCYTES NFR BLD AUTO: 9 % (ref 4–12)
NEUTROPHILS # BLD AUTO: 2.98 THOUSANDS/ÂΜL (ref 1.85–7.62)
NEUTS SEG NFR BLD AUTO: 46 % (ref 43–75)
NRBC BLD AUTO-RTO: 0 /100 WBCS
PLATELET # BLD AUTO: 287 THOUSANDS/UL (ref 149–390)
PMV BLD AUTO: 9 FL (ref 8.9–12.7)
POTASSIUM SERPL-SCNC: 4 MMOL/L (ref 3.5–5.3)
PROT SERPL-MCNC: 6.9 G/DL (ref 6.4–8.4)
RBC # BLD AUTO: 4.88 MILLION/UL (ref 3.88–5.62)
SODIUM SERPL-SCNC: 139 MMOL/L (ref 135–147)
WBC # BLD AUTO: 6.44 THOUSAND/UL (ref 4.31–10.16)

## 2024-05-04 PROCEDURE — 99232 SBSQ HOSP IP/OBS MODERATE 35: CPT | Performed by: FAMILY MEDICINE

## 2024-05-04 PROCEDURE — 80053 COMPREHEN METABOLIC PANEL: CPT | Performed by: FAMILY MEDICINE

## 2024-05-04 PROCEDURE — 85025 COMPLETE CBC W/AUTO DIFF WBC: CPT | Performed by: FAMILY MEDICINE

## 2024-05-04 PROCEDURE — 71045 X-RAY EXAM CHEST 1 VIEW: CPT

## 2024-05-04 RX ORDER — OXYCODONE HYDROCHLORIDE 5 MG/1
5 TABLET ORAL EVERY 6 HOURS PRN
Status: DISCONTINUED | OUTPATIENT
Start: 2024-05-04 | End: 2024-05-05 | Stop reason: HOSPADM

## 2024-05-04 RX ADMIN — Medication 3 MG: at 21:08

## 2024-05-04 RX ADMIN — HYDROMORPHONE HYDROCHLORIDE 1 MG: 1 INJECTION, SOLUTION INTRAMUSCULAR; INTRAVENOUS; SUBCUTANEOUS at 21:09

## 2024-05-04 RX ADMIN — HYDROMORPHONE HYDROCHLORIDE 1 MG: 1 INJECTION, SOLUTION INTRAMUSCULAR; INTRAVENOUS; SUBCUTANEOUS at 09:04

## 2024-05-04 RX ADMIN — FENTANYL CITRATE 25 MCG: 50 INJECTION, SOLUTION INTRAMUSCULAR; INTRAVENOUS at 02:49

## 2024-05-04 RX ADMIN — HEPARIN SODIUM 5000 UNITS: 5000 INJECTION, SOLUTION INTRAVENOUS; SUBCUTANEOUS at 21:08

## 2024-05-04 RX ADMIN — FENTANYL CITRATE 25 MCG: 50 INJECTION, SOLUTION INTRAMUSCULAR; INTRAVENOUS at 07:13

## 2024-05-04 RX ADMIN — HYDROMORPHONE HYDROCHLORIDE 1 MG: 1 INJECTION, SOLUTION INTRAMUSCULAR; INTRAVENOUS; SUBCUTANEOUS at 13:46

## 2024-05-04 RX ADMIN — HYDROMORPHONE HYDROCHLORIDE 1 MG: 1 INJECTION, SOLUTION INTRAMUSCULAR; INTRAVENOUS; SUBCUTANEOUS at 05:08

## 2024-05-04 RX ADMIN — HYDROMORPHONE HYDROCHLORIDE 1 MG: 1 INJECTION, SOLUTION INTRAMUSCULAR; INTRAVENOUS; SUBCUTANEOUS at 00:10

## 2024-05-04 RX ADMIN — OXYCODONE HYDROCHLORIDE 5 MG: 5 TABLET ORAL at 10:00

## 2024-05-04 RX ADMIN — HEPARIN SODIUM 5000 UNITS: 5000 INJECTION, SOLUTION INTRAVENOUS; SUBCUTANEOUS at 13:46

## 2024-05-04 RX ADMIN — OXYCODONE HYDROCHLORIDE 5 MG: 5 TABLET ORAL at 17:28

## 2024-05-04 RX ADMIN — HEPARIN SODIUM 5000 UNITS: 5000 INJECTION, SOLUTION INTRAVENOUS; SUBCUTANEOUS at 05:09

## 2024-05-04 NOTE — ASSESSMENT & PLAN NOTE
Presented to the ER with complaints of right-sided chest pain   Patient reports being persistent over the past 2 days with radiation to right shoulder and his neck  CT shows-Large right hydropneumothorax with collapse of the entire right lobe. No significant mediastinal shift to suggest tension.     Chest tube placed in ED, repeat CXR shows re-expansion. Repeat CXR today improving   Pulm and surgical assistance appreciated  CXR stable, CT to water seal without evidence of leak

## 2024-05-04 NOTE — PLAN OF CARE
Problem: PAIN - ADULT  Goal: Verbalizes/displays adequate comfort level or baseline comfort level  Description: Interventions:  - Encourage patient to monitor pain and request assistance  - Assess pain using appropriate pain scale  - Administer analgesics based on type and severity of pain and evaluate response  - Implement non-pharmacological measures as appropriate and evaluate response  - Consider cultural and social influences on pain and pain management  - Notify physician/advanced practitioner if interventions unsuccessful or patient reports new pain  Outcome: Progressing     Problem: INFECTION - ADULT  Goal: Absence or prevention of progression during hospitalization  Description: INTERVENTIONS:  - Assess and monitor for signs and symptoms of infection  - Monitor lab/diagnostic results  - Monitor all insertion sites, i.e. indwelling lines, tubes, and drains  - Monitor endotracheal if appropriate and nasal secretions for changes in amount and color  - [place] appropriate cooling/warming therapies per order  - Administer medications as ordered  - Instruct and encourage patient and family to use good hand hygiene technique  - Identify and instruct in appropriate isolation precautions for identified infection/condition  Outcome: Progressing     Problem: SAFETY ADULT  Goal: Patient will remain free of falls  Description: INTERVENTIONS:  - Educate patient/family on patient safety including physical limitations  - Instruct patient to call for assistance with activity   - Consult OT/PT to assist with strengthening/mobility   - Keep Call bell within reach  - Keep bed low and locked with side rails adjusted as appropriate  - Keep care items and personal belongings within reach  - Initiate and maintain comfort rounds  - Make Fall Risk Sign visible to staff  - Offer Toileting every 2 Hours, in advance of need  - Initiate/Maintain fall alarm as needed  - Obtain necessary fall risk management equipment: nonskid  footwear, call bell  - Apply yellow socks and bracelet for high fall risk patients  - Consider moving patient to room near nurses station  Outcome: Progressing  Goal: Maintain or return to baseline ADL function  Description: INTERVENTIONS:  -  Assess patient's ability to carry out ADLs; assess patient's baseline for ADL function and identify physical deficits which impact ability to perform ADLs (bathing, care of mouth/teeth, toileting, grooming, dressing, etc.)  - Assess/evaluate cause of self-care deficits   - Assess range of motion  - Assess patient's mobility; develop plan if impaired  - Assess patient's need for assistive devices and provide as appropriate  - Encourage maximum independence but intervene and supervise when necessary  - Involve family in performance of ADLs  - Assess for home care needs following discharge   - Consider OT consult to assist with ADL evaluation and planning for discharge  - Provide patient education as appropriate  Outcome: Progressing  Goal: Maintains/Returns to pre admission functional level  Description: INTERVENTIONS:  - Perform AM-PAC 6 Click Basic Mobility/ Daily Activity assessment daily.  - Set and communicate daily mobility goal to care team and patient/family/caregiver.   - Collaborate with rehabilitation services on mobility goals if consulted  - Perform Range of Motion 4 times a day.  - Reposition patient every 2 hours.  - Dangle patient 3 times a day  - Stand patient 3 times a day  - Ambulate patient 3 times a day  - Out of bed to chair 3 times a day   - Out of bed for meals 3 times a day  - Out of bed for toileting  - Record patient progress and toleration of activity level   Outcome: Progressing     Problem: DISCHARGE PLANNING  Goal: Discharge to home or other facility with appropriate resources  Description: INTERVENTIONS:  - Identify barriers to discharge w/patient and caregiver  - Arrange for needed discharge resources and transportation as appropriate  - Identify  discharge learning needs (meds, wound care, etc.)  - Arrange for interpretive services to assist at discharge as needed  - Refer to Case Management Department for coordinating discharge planning if the patient needs post-hospital services based on physician/advanced practitioner order or complex needs related to functional status, cognitive ability, or social support system  Outcome: Progressing     Problem: Knowledge Deficit  Goal: Patient/family/caregiver demonstrates understanding of disease process, treatment plan, medications, and discharge instructions  Description: Complete learning assessment and assess knowledge base.  Interventions:  - Provide teaching at level of understanding  - Provide teaching via preferred learning methods  Outcome: Progressing     Problem: RESPIRATORY - ADULT  Goal: Achieves optimal ventilation and oxygenation  Description: INTERVENTIONS:  - Assess for changes in respiratory status  - Assess for changes in mentation and behavior  - Position to facilitate oxygenation and minimize respiratory effort  - Oxygen administered by appropriate delivery if ordered  - Initiate smoking cessation education as indicated  - Encourage broncho-pulmonary hygiene including cough, deep breathe, Incentive Spirometry  - Assess the need for suctioning and aspirate as needed  - Assess and instruct to report SOB or any respiratory difficulty  - Respiratory Therapy support as indicated  Outcome: Progressing     Problem: SKIN/TISSUE INTEGRITY - ADULT  Goal: Incision(s), wounds(s) or drain site(s) healing without S/S of infection  Description: INTERVENTIONS  - Assess and document dressing, incision, wound bed, drain sites and surrounding tissue  - Provide patient and family education  - Perform skin care/dressing changes every day or as ordered  Outcome: Progressing

## 2024-05-04 NOTE — PROGRESS NOTES
"Progress Note - General Surgery   Marcus Barrera 44 y.o. male MRN: [id number]  Unit/Bed#:  Encounter: [id number]    Assessment:  44-year-old male with spontaneous right pneumothorax.  This has been successfully treated with thoracostomy tube.  The patient has been on waterseal since yesterday.  No evidence of recurrent pneumothorax on chest x-ray.  Also, it appears as though the chest tube has become a bit retracted.  The patient does have some pain around the tube site, no other complaints    Plan:  Chest tube removed without difficulty, dressing over tube site may be removed in 2 days  Repeat chest x-ray in 4 hours for reevaluation status post chest tube removal  The patient will remain admitted overnight for monitoring, repeat chest x-ray will also be completed in the morning.    Subjective/Objective       Subjective: The patient complains of pain around the chest tube site, no other complaints today    Objective:     Blood pressure 128/87, pulse 73, temperature 97.6 °F (36.4 °C), temperature source Temporal, resp. rate 14, height 6' 2\" (1.88 m), weight 86.9 kg (191 lb 9.3 oz), SpO2 98%.,Body mass index is 24.6 kg/m².      Intake/Output Summary (Last 24 hours) at 5/4/2024 1456  Last data filed at 5/4/2024 1447  Gross per 24 hour   Intake 1730 ml   Output 2150 ml   Net -420 ml       Invasive Devices       Peripheral Intravenous Line  Duration             Peripheral IV 05/02/24 Right;Ventral (anterior) Forearm 1 day              Drain  Duration             Chest Tube Right Fourth intercostal space 1 day                    Physical Exam: General appearance: alert and oriented, in no acute distress  Head: Normocephalic, without obvious abnormality, atraumatic  Chest wall: Right chest tube intact lateral right chest wall, no airleak appreciated in pneumovac  Skin: Skin color, texture, turgor normal. No rashes or lesions  Neurologic: Grossly normal    Lab, Imaging and other studies:CBC:   Lab Results "   Component Value Date    WBC 6.44 05/04/2024    HGB 14.9 05/04/2024    HCT 45.8 05/04/2024    MCV 94 05/04/2024     05/04/2024    RBC 4.88 05/04/2024    MCH 30.5 05/04/2024    MCHC 32.5 05/04/2024    RDW 12.6 05/04/2024    MPV 9.0 05/04/2024    NRBC 0 05/04/2024   , CMP:   Lab Results   Component Value Date    SODIUM 139 05/04/2024    K 4.0 05/04/2024     05/04/2024    CO2 29 05/04/2024    BUN 16 05/04/2024    CREATININE 0.87 05/04/2024    CALCIUM 9.2 05/04/2024    AST 12 (L) 05/04/2024    ALT 13 05/04/2024    ALKPHOS 56 05/04/2024    EGFR 105 05/04/2024     VTE Pharmacologic Prophylaxis: Heparin  VTE Mechanical Prophylaxis: sequential compression device

## 2024-05-04 NOTE — PLAN OF CARE
Problem: SAFETY ADULT  Goal: Patient will remain free of falls  Description: INTERVENTIONS:  - Educate patient/family on patient safety including physical limitations  - Instruct patient to call for assistance with activity   - Consult OT/PT to assist with strengthening/mobility   - Keep Call bell within reach  - Keep bed low and locked with side rails adjusted as appropriate  - Keep care items and personal belongings within reach  - Initiate and maintain comfort rounds  - Make Fall Risk Sign visible to staff  - Offer Toileting every 2 Hours, in advance of need  - Initiate/Maintain fall alarm as needed  - Obtain necessary fall risk management equipment: nonskid footwear, call bell  - Apply yellow socks and bracelet for high fall risk patients  - Consider moving patient to room near nurses station  Outcome: Progressing  Goal: Maintain or return to baseline ADL function  Description: INTERVENTIONS:  -  Assess patient's ability to carry out ADLs; assess patient's baseline for ADL function and identify physical deficits which impact ability to perform ADLs (bathing, care of mouth/teeth, toileting, grooming, dressing, etc.)  - Assess/evaluate cause of self-care deficits   - Assess range of motion  - Assess patient's mobility; develop plan if impaired  - Assess patient's need for assistive devices and provide as appropriate  - Encourage maximum independence but intervene and supervise when necessary  - Involve family in performance of ADLs  - Assess for home care needs following discharge   - Consider OT consult to assist with ADL evaluation and planning for discharge  - Provide patient education as appropriate  Outcome: Progressing     Problem: DISCHARGE PLANNING  Goal: Discharge to home or other facility with appropriate resources  Description: INTERVENTIONS:  - Identify barriers to discharge w/patient and caregiver  - Arrange for needed discharge resources and transportation as appropriate  - Identify discharge  learning needs (meds, wound care, etc.)  - Arrange for interpretive services to assist at discharge as needed  - Refer to Case Management Department for coordinating discharge planning if the patient needs post-hospital services based on physician/advanced practitioner order or complex needs related to functional status, cognitive ability, or social support system  Outcome: Progressing     Problem: Knowledge Deficit  Goal: Patient/family/caregiver demonstrates understanding of disease process, treatment plan, medications, and discharge instructions  Description: Complete learning assessment and assess knowledge base.  Interventions:  - Provide teaching at level of understanding  - Provide teaching via preferred learning methods  Outcome: Progressing

## 2024-05-04 NOTE — PROGRESS NOTES
"[facility]  Progress Note  Name: Marcus Barrera I  MRN: [id number]  Unit/Bed#:  I Date of Admission: 5/2/2024   Date of Service: 5/4/2024 I Hospital Day: 2    Assessment/Plan   * Primary spontaneous pneumothorax  Assessment & Plan  Presented to the ER with complaints of right-sided chest pain   Patient reports being persistent over the past 2 days with radiation to right shoulder and his neck  CT shows-Large right hydropneumothorax with collapse of the entire right lobe. No significant mediastinal shift to suggest tension.     Chest tube placed in ED, repeat CXR shows re-expansion. Repeat CXR today improving   Pulm and surgical assistance appreciated  CXR stable, CT to water seal without evidence of leak   Will discuss with surgery if could clamp and check CXR   Adjust pain control regimen    Chronic obstructive pulmonary disease (HCC)  Assessment & Plan  Likely has underlying COPD by smoking history and exam  Considering smoking cessation              Progress Note - Marcus Barrera 44 y.o. male MRN: [id number]    Unit/Bed#:  Encounter: [id number]        Subjective:   Patient seen and examined, states his pain is not well controlled    Objective:     Vitals:   Vitals:    05/04/24 0725   BP:    Pulse:    Resp:    Temp: 97.5 °F (36.4 °C)   SpO2:      Body mass index is 24.6 kg/m².    Intake/Output Summary (Last 24 hours) at 5/4/2024 0949  Last data filed at 5/4/2024 0925  Gross per 24 hour   Intake 3487.08 ml   Output 2350 ml   Net 1137.08 ml       Physical Exam:   BP (!) 171/116   Pulse 75   Temp 97.5 °F (36.4 °C) (Temporal)   Resp (!) 11   Ht 6' 2\" (1.88 m)   Wt 86.9 kg (191 lb 9.3 oz)   SpO2 98%   BMI 24.60 kg/m²   General appearance: alert and oriented, in no acute distress  Lungs: clear to auscultation bilaterally  Heart: regular rate and rhythm, S1, S2 normal, no murmur, click, rub or gallop  Abdomen: soft, non-tender; bowel sounds normal; no masses,  no " organomegaly  Extremities: extremities normal, warm and well-perfused; no cyanosis, clubbing, or edema  Pulses: 2+ and symmetric  Neurologic: Grossly normal     Invasive Devices       Peripheral Intravenous Line  Duration             Peripheral IV 05/02/24 Right;Ventral (anterior) Forearm 1 day              Drain  Duration             Chest Tube Right Fourth intercostal space 1 day                    Results from last 7 days   Lab Units 05/04/24  0456 05/03/24  0603 05/02/24  1555   WBC Thousand/uL 6.44 7.76 9.10   HEMOGLOBIN g/dL 14.9 15.5 16.4   HEMATOCRIT % 45.8 47.4 48.9   PLATELETS Thousands/uL 287 285 311       Results from last 7 days   Lab Units 05/04/24  0456 05/03/24  0603 05/02/24  1555   POTASSIUM mmol/L 4.0 4.3 3.8   CHLORIDE mmol/L 104 105 105   CO2 mmol/L 29 24 26   BUN mg/dL 16 18 15   CREATININE mg/dL 0.87 0.84 0.91   CALCIUM mg/dL 9.2 9.2 9.7   ALK PHOS U/L 56 62 65   ALT U/L 13 12 16   AST U/L 12* 15 16       Medication Administration - last 24 hours from 05/03/2024 0949 to 05/04/2024 0949         Date/Time Order Dose Route Action Action by     05/04/2024 0904 EDT nicotine (NICODERM CQ) 21 mg/24 hr TD 24 hr patch 1 patch 1 patch Transdermal Not Given Liza Umanzor RN     05/04/2024 0509 EDT heparin (porcine) subcutaneous injection 5,000 Units 5,000 Units Subcutaneous Given Bindu Sher, YASMEEN     05/03/2024 2104 EDT heparin (porcine) subcutaneous injection 5,000 Units 5,000 Units Subcutaneous Given Bindu Nikky, YASMEEN     05/03/2024 1436 EDT heparin (porcine) subcutaneous injection 5,000 Units 5,000 Units Subcutaneous Given Arely Orellana RN     05/03/2024 1057 EDT sodium chloride 0.9 % infusion 0 mL/hr Intravenous Stopped Arely Orellana RN     05/04/2024 0713 EDT fentaNYL injection 25 mcg 25 mcg Intravenous Given Bindu Sher, YASMEEN     05/04/2024 0249 EDT fentaNYL injection 25 mcg 25 mcg Intravenous Given Bindu Sher, YASMEEN     05/03/2024 2103 EDT fentaNYL injection 25 mcg 25 mcg Intravenous Given Bindu Sher RN      05/03/2024 1708 EDT fentaNYL injection 25 mcg 25 mcg Intravenous Given Arely Orellana RN     05/03/2024 1315 EDT fentaNYL injection 25 mcg 25 mcg Intravenous Given Arely Orellana RN     05/04/2024 0904 EDT HYDROmorphone (DILAUDID) injection 1 mg 1 mg Intravenous Given Liza Umanzor RN     05/04/2024 0508 EDT HYDROmorphone (DILAUDID) injection 1 mg 1 mg Intravenous Given Bindu Sher RN     05/04/2024 0010 EDT HYDROmorphone (DILAUDID) injection 1 mg 1 mg Intravenous Given Bindu Sher, YASMEEN     05/03/2024 1940 EDT HYDROmorphone (DILAUDID) injection 1 mg 1 mg Intravenous Given Bindu Sher, YASMEEN     05/03/2024 1505 EDT HYDROmorphone (DILAUDID) injection 1 mg 1 mg Intravenous Given Arely Orellana RN     05/03/2024 1113 EDT HYDROmorphone (DILAUDID) injection 1 mg 1 mg Intravenous Given Arely Orellana RN     05/03/2024 2104 EDT melatonin tablet 3 mg 3 mg Oral Given Bindu Sher RN              Lab, Imaging and other studies: I have personally reviewed pertinent reports.    VTE Pharmacologic Prophylaxis: Heparin  VTE Mechanical Prophylaxis: sequential compression device     Alonzo Nicholson MD  5/4/2024,9:49 AM

## 2024-05-05 ENCOUNTER — APPOINTMENT (INPATIENT)
Dept: RADIOLOGY | Facility: HOSPITAL | Age: 44
DRG: 143 | End: 2024-05-05
Payer: COMMERCIAL

## 2024-05-05 VITALS
RESPIRATION RATE: 35 BRPM | OXYGEN SATURATION: 97 % | SYSTOLIC BLOOD PRESSURE: 142 MMHG | BODY MASS INDEX: 24.59 KG/M2 | HEART RATE: 81 BPM | DIASTOLIC BLOOD PRESSURE: 92 MMHG | TEMPERATURE: 97.4 F | HEIGHT: 74 IN | WEIGHT: 191.58 LBS

## 2024-05-05 PROBLEM — J93.11 PRIMARY SPONTANEOUS PNEUMOTHORAX: Status: RESOLVED | Noted: 2024-05-02 | Resolved: 2024-05-05

## 2024-05-05 PROBLEM — J93.9 PNEUMOTHORAX ON RIGHT: Status: RESOLVED | Noted: 2024-05-03 | Resolved: 2024-05-05

## 2024-05-05 PROBLEM — R07.89 OTHER CHEST PAIN: Status: RESOLVED | Noted: 2024-05-02 | Resolved: 2024-05-05

## 2024-05-05 PROCEDURE — 71045 X-RAY EXAM CHEST 1 VIEW: CPT

## 2024-05-05 PROCEDURE — 97162 PT EVAL MOD COMPLEX 30 MIN: CPT

## 2024-05-05 PROCEDURE — 99239 HOSP IP/OBS DSCHRG MGMT >30: CPT | Performed by: FAMILY MEDICINE

## 2024-05-05 PROCEDURE — 97166 OT EVAL MOD COMPLEX 45 MIN: CPT

## 2024-05-05 RX ORDER — ALBUTEROL SULFATE 90 UG/1
2 AEROSOL, METERED RESPIRATORY (INHALATION) EVERY 6 HOURS PRN
Qty: 18 G | Refills: 0 | Status: SHIPPED | OUTPATIENT
Start: 2024-05-05

## 2024-05-05 RX ORDER — NICOTINE 21 MG/24HR
1 PATCH, TRANSDERMAL 24 HOURS TRANSDERMAL DAILY
Qty: 28 PATCH | Refills: 0 | Status: SHIPPED | OUTPATIENT
Start: 2024-05-06

## 2024-05-05 RX ADMIN — HEPARIN SODIUM 5000 UNITS: 5000 INJECTION, SOLUTION INTRAVENOUS; SUBCUTANEOUS at 05:13

## 2024-05-05 RX ADMIN — OXYCODONE HYDROCHLORIDE 5 MG: 5 TABLET ORAL at 00:57

## 2024-05-05 NOTE — ASSESSMENT & PLAN NOTE
Milon Halsted is a 45year old female with MS not on DMT with recent relapse with at least 6 new/acute and enhancing demyelinating lesions of her brain  We again discussed the role of DMT in preventing relapses/new lesions and therefore preventing further permanent disability  Her insurance requires she try and fail 3 preferred MS agents  We discussed given the options provided by her insurance, Tysabri would be the most optimal choice  We discussed the mechanism of action of Tysabri, administration and frequency, risks v benefits, required monitoring, REMS program, and risk of PML  She is JCV negative  She was agreeable to proceeding and signed a start form today  I have asked her to complete an updated CBC and Iron panel, as her prior CBC was concerning for anemia which she does confirm a prior hx of in the past  Pending results will consider referral to hematology  Given her continued numbness and tingling in the right hand, I have asked her to further increased her gabapentin from 100 mg tid, to 300 mg bid or tid (as tolerated)  We discussed her MRI C-Spine does reveal moderate to severe right foraminal stenosis  There is concern that her right hand numbness and pain may actually be cervical radiculopathy related  As such I have also asked to begin physical therapy for her neck, in addition to increasing gabapentin  May consider EMG if no improvement  She will follow up in 3 months; sooner if needed      Plan:  - Will start Tysabri (start form signed today)  - Complete labs (CBC and Iron panel)  - Continue physical therapy  - Increase Gabapentin to 300 mg twice or three times a day   - Follow up in 3 months Presented to the ER with complaints of right-sided chest pain   Patient reports being persistent over the past 2 days with radiation to right shoulder and his neck  CT shows-Large right hydropneumothorax with collapse of the entire right lobe. No significant mediastinal shift to suggest tension.     Chest tube placed in ED, repeat CXR shows re-expansion. Repeat CXR today improving   Chest tube removed 5/4/24  Pulm and surgical assistance appreciated  CXR on 5/5/24 stable  Outpatient follow-up with pulmonology

## 2024-05-05 NOTE — CASE MANAGEMENT
Case Management Discharge Planning Note    Patient name Marcus Barrera  Location / MRN [id number]  : 1980 Date 2024       Current Admission Date: 2024  Current Admission Diagnosis:Tobacco abuse   Patient Active Problem List    Diagnosis Date Noted    Tobacco abuse 2024    Chronic obstructive pulmonary disease (HCC) 2024      LOS (days): 3  Geometric Mean LOS (GMLOS) (days):   Days to GMLOS:     OBJECTIVE:  Risk of Unplanned Readmission Score: 8.16         Current admission status: Inpatient   Preferred Pharmacy:   [id number]  [place] - [address], [address]  [address] [facility], [address]  [facility] [address]  Phone: [phone] Fax: [phone]    [id number]  [place]  [address]  [address] [place] [address]  Phone: [phone] Fax: [phone]    [id number]  [place] - [address]  [address] [facility] [address]  Phone: [phone] Fax: [phone]    Primary Care Provider: No primary care provider on file.    Primary Insurance:   Secondary Insurance:     DISCHARGE DETAILS:     Pt is being dc'd home on this date with OP follow up.

## 2024-05-05 NOTE — ASSESSMENT & PLAN NOTE
Likely has underlying COPD by smoking history and exam  Will need repeat chest CT and PFTs  Outpatient pulm f/u

## 2024-05-05 NOTE — DISCHARGE SUMMARY
[facility]  Discharge- Marcus Barrera 1980, 44 y.o. male MRN: [id number]  Unit/Bed#:  Encounter: [id number]  Primary Care Provider: No primary care provider on file.   Date and time admitted to hospital: 5/2/2024  3:32 PM    * Primary spontaneous pneumothorax-resolved as of 5/5/2024  Assessment & Plan  Presented to the ER with complaints of right-sided chest pain   Patient reports being persistent over the past 2 days with radiation to right shoulder and his neck  CT shows-Large right hydropneumothorax with collapse of the entire right lobe. No significant mediastinal shift to suggest tension.     Chest tube placed in ED, repeat CXR shows re-expansion. Repeat CXR today improving   Chest tube removed 5/4/24  Pulm and surgical assistance appreciated  CXR on 5/5/24 stable  Outpatient follow-up with pulmonology    Chronic obstructive pulmonary disease (HCC)  Assessment & Plan  Likely has underlying COPD by smoking history and exam  Will need repeat chest CT and PFTs  Outpatient pulm f/u    Tobacco abuse  Assessment & Plan  Smoking cessation   Outpatient referral to  to establish care  Outpatient referral to pulmonology        Discharging Physician / Practitioner: Seema Lacy MD  PCP: No primary care provider on file.  Admission Date:   Admission Orders (From admission, onward)       Ordered        05/02/24 1827  INPATIENT ADMISSION  Once                          Discharge Date: 05/05/24    Medical Problems       Resolved Problems  Date Reviewed: 5/4/2024            Resolved    * (Principal) Primary spontaneous pneumothorax 5/5/2024     Resolved by  Seema Lacy MD    Other chest pain 5/5/2024     Resolved by  Seema Lacy MD    Pneumothorax on right 5/5/2024     Resolved by  Seema Lacy MD          Consultations During Hospital Stay:  Pulmonology  General surgery    Procedures Performed:     XR chest portable   Final Result      No pneumothorax.             Workstation performed: [id number]         XR chest portable   Final Result      No pneumothorax.            Workstation performed: [id number]         XR chest portable   Final Result      Partially pulled out chest tube. No pneumothorax.            Workstation performed: [id number]         XR chest portable ICU   Final Result      Right chest tube with stable trace right apical pneumothorax.            Workstation performed: [id number]         XR chest portable   Final Result      Persistent trace right apical pneumothorax.            Workstation performed: [id number]         XR chest portable   ED Interpretation   Postprocedure x-ray.  12 [place] chest tube in place.  Note lung reexpansion.      Final Result      Chest tube over the medial right base with trace right apical pneumothorax.      Moderate right base atelectasis.            Workstation performed: [id number]         CT chest without contrast   Final Result      Large right hydropneumothorax with collapse of the entire right lobe. No significant mediastinal shift to suggest tension.      Emphysema. No underlying cystic lung disease.                     Workstation performed: [id number]         XR chest 2 views   ED Interpretation   Large right-sided pneumothorax noted.      Final Result      Large right pneumothorax with near complete collapse of the right lung. No significant mediastinal shift.         Findings concur with preliminary interpretation documented by Dr. Jacobs in the emergency room.      Workstation performed: [id number]               Significant Findings / Test Results:   Results for orders placed or performed during the hospital encounter of 05/02/24   COVID only    Specimen: Nose; Nares   Result Value Ref Range    SARS-CoV-2 Negative Negative   CBC and differential   Result Value Ref Range    WBC 9.10 4.31 - 10.16 Thousand/uL    RBC 5.32 3.88 - 5.62 Million/uL    Hemoglobin 16.4 12.0 - 17.0 g/dL    Hematocrit 48.9 36.5 - 49.3 %    MCV 92  "82 - 98 fL    MCH 30.8 26.8 - 34.3 pg    MCHC 33.5 31.4 - 37.4 g/dL    RDW 12.8 11.6 - 15.1 %    MPV 8.9 8.9 - 12.7 fL    Platelets 311 149 - 390 Thousands/uL    nRBC 0 /100 WBCs    Segmented % 59 43 - 75 %    Immature Grans % 0 0 - 2 %    Lymphocytes % 30 14 - 44 %    Monocytes % 7 4 - 12 %    Eosinophils Relative 3 0 - 6 %    Basophils Relative 1 0 - 1 %    Absolute Neutrophils 5.43 1.85 - 7.62 Thousands/µL    Absolute Immature Grans 0.01 0.00 - 0.20 Thousand/uL    Absolute Lymphocytes 2.71 0.60 - 4.47 Thousands/µL    Absolute Monocytes 0.67 0.17 - 1.22 Thousand/µL    Eosinophils Absolute 0.23 0.00 - 0.61 Thousand/µL    Basophils Absolute 0.05 0.00 - 0.10 Thousands/µL   Comprehensive metabolic panel   Result Value Ref Range    Sodium 139 135 - 147 mmol/L    Potassium 3.8 3.5 - 5.3 mmol/L    Chloride 105 96 - 108 mmol/L    CO2 26 21 - 32 mmol/L    ANION GAP 8 4 - 13 mmol/L    BUN 15 5 - 25 mg/dL    Creatinine 0.91 0.60 - 1.30 mg/dL    Glucose 101 65 - 140 mg/dL    Calcium 9.7 8.4 - 10.2 mg/dL    AST 16 13 - 39 U/L    ALT 16 7 - 52 U/L    Alkaline Phosphatase 65 34 - 104 U/L    Total Protein 7.9 6.4 - 8.4 g/dL    Albumin 4.8 3.5 - 5.0 g/dL    Total Bilirubin 0.57 0.20 - 1.00 mg/dL    eGFR 102 ml/min/1.73sq m   Phosphorus   Result Value Ref Range    Phosphorus 3.4 2.7 - 4.5 mg/dL   Magnesium   Result Value Ref Range    Magnesium 2.2 1.9 - 2.7 mg/dL   HS Troponin 0hr (reflex protocol)   Result Value Ref Range    hs TnI 0hr 3 \"Refer to ACS Flowchart\"- see link ng/L   HS Troponin I 2hr   Result Value Ref Range    hs TnI 2hr <2 \"Refer to ACS Flowchart\"- see link ng/L    Delta 2hr hsTnI <-1 <20 ng/L   Comprehensive metabolic panel   Result Value Ref Range    Sodium 137 135 - 147 mmol/L    Potassium 4.3 3.5 - 5.3 mmol/L    Chloride 105 96 - 108 mmol/L    CO2 24 21 - 32 mmol/L    ANION GAP 8 4 - 13 mmol/L    BUN 18 5 - 25 mg/dL    Creatinine 0.84 0.60 - 1.30 mg/dL    Glucose 100 65 - 140 mg/dL    Calcium 9.2 8.4 - 10.2 " mg/dL    AST 15 13 - 39 U/L    ALT 12 7 - 52 U/L    Alkaline Phosphatase 62 34 - 104 U/L    Total Protein 7.0 6.4 - 8.4 g/dL    Albumin 4.2 3.5 - 5.0 g/dL    Total Bilirubin 0.62 0.20 - 1.00 mg/dL    eGFR 107 ml/min/1.73sq m   CBC and differential   Result Value Ref Range    WBC 7.76 4.31 - 10.16 Thousand/uL    RBC 5.04 3.88 - 5.62 Million/uL    Hemoglobin 15.5 12.0 - 17.0 g/dL    Hematocrit 47.4 36.5 - 49.3 %    MCV 94 82 - 98 fL    MCH 30.8 26.8 - 34.3 pg    MCHC 32.7 31.4 - 37.4 g/dL    RDW 13.0 11.6 - 15.1 %    MPV 8.9 8.9 - 12.7 fL    Platelets 285 149 - 390 Thousands/uL    nRBC 0 /100 WBCs    Segmented % 56 43 - 75 %    Immature Grans % 0 0 - 2 %    Lymphocytes % 30 14 - 44 %    Monocytes % 8 4 - 12 %    Eosinophils Relative 5 0 - 6 %    Basophils Relative 1 0 - 1 %    Absolute Neutrophils 4.41 1.85 - 7.62 Thousands/µL    Absolute Immature Grans 0.01 0.00 - 0.20 Thousand/uL    Absolute Lymphocytes 2.31 0.60 - 4.47 Thousands/µL    Absolute Monocytes 0.61 0.17 - 1.22 Thousand/µL    Eosinophils Absolute 0.36 0.00 - 0.61 Thousand/µL    Basophils Absolute 0.06 0.00 - 0.10 Thousands/µL   CBC and differential   Result Value Ref Range    WBC 6.44 4.31 - 10.16 Thousand/uL    RBC 4.88 3.88 - 5.62 Million/uL    Hemoglobin 14.9 12.0 - 17.0 g/dL    Hematocrit 45.8 36.5 - 49.3 %    MCV 94 82 - 98 fL    MCH 30.5 26.8 - 34.3 pg    MCHC 32.5 31.4 - 37.4 g/dL    RDW 12.6 11.6 - 15.1 %    MPV 9.0 8.9 - 12.7 fL    Platelets 287 149 - 390 Thousands/uL    nRBC 0 /100 WBCs    Segmented % 46 43 - 75 %    Immature Grans % 0 0 - 2 %    Lymphocytes % 38 14 - 44 %    Monocytes % 9 4 - 12 %    Eosinophils Relative 6 0 - 6 %    Basophils Relative 1 0 - 1 %    Absolute Neutrophils 2.98 1.85 - 7.62 Thousands/µL    Absolute Immature Grans 0.01 0.00 - 0.20 Thousand/uL    Absolute Lymphocytes 2.45 0.60 - 4.47 Thousands/µL    Absolute Monocytes 0.58 0.17 - 1.22 Thousand/µL    Eosinophils Absolute 0.37 0.00 - 0.61 Thousand/µL    Basophils  Absolute 0.05 0.00 - 0.10 Thousands/µL   Comprehensive metabolic panel   Result Value Ref Range    Sodium 139 135 - 147 mmol/L    Potassium 4.0 3.5 - 5.3 mmol/L    Chloride 104 96 - 108 mmol/L    CO2 29 21 - 32 mmol/L    ANION GAP 6 4 - 13 mmol/L    BUN 16 5 - 25 mg/dL    Creatinine 0.87 0.60 - 1.30 mg/dL    Glucose 94 65 - 140 mg/dL    Calcium 9.2 8.4 - 10.2 mg/dL    AST 12 (L) 13 - 39 U/L    ALT 13 7 - 52 U/L    Alkaline Phosphatase 56 34 - 104 U/L    Total Protein 6.9 6.4 - 8.4 g/dL    Albumin 4.1 3.5 - 5.0 g/dL    Total Bilirubin 0.45 0.20 - 1.00 mg/dL    eGFR 105 ml/min/1.73sq m   ECG 12 lead   Result Value Ref Range    Ventricular Rate 102 BPM    Atrial Rate 102 BPM    DC Interval 152 ms    QRSD Interval 96 ms    QT Interval 328 ms    QTC Interval 427 ms    P [place] 151 degrees    QRS Axis 81 degrees    T Wave Axis 98 degrees         Incidental Findings:   Chest CT on 5/2/24 shows: Emphysema. No underlying cystic lung disease     Test Results Pending at Discharge (will require follow up):   None     Outpatient Tests Requested:  Follow-up with pulmonology, CT chest    Complications:  None    Reason for Admission: Chest pain    Hospital Course:     Marcus Barrera is a 44 y.o. male patient who originally presented to the hospital on 5/2/2024 due to right-sided chest pain for the previous 2 days, similar to previous chest pain with prior spontaneous left-sided pneumothorax 12 years ago. Workup in the ED showed sinus tachycardia with large right hydropneumothorax and collapse of the entire right lobe of the lung. Patient is a daily smoker and was vaping THC immediately before a coughing fit which triggered, which likely caused the pneumothorax. Chest tube was subsequently placed in the ED. Pulmonology was consulted and recommended outpatient follow-up; general surgery was also consulted for monitoring and removed the chest tube on 5/4/2024. Repeat chest x-ray immediately after removal and again on 5/5/2024 showed  no pneumothorax or pleural effusion. Patient was discharged in improved and stable condition with outpatient prescriptions for nicotine patch and albuterol inhaler PRN. Smoking cessation was encouraged and referral for follow-up with outpatient pulmonology was placed.      Please see above list of diagnoses and related plan for additional information.     HPI per Admission:   Marcus Barrera is a 44 y.o. male with a PMH of pneumothorax who presents to the emergency room for evaluation of complaint of right-sided crampy chest pain.  Patient reports that chest pain started 2 days ago and has been persistent since then.  Reports that his pain is worse with movement and radiates to his right shoulder and his neck.  Patient reports that chest pain feels very similar to when he had a pneumothorax in the past.  Patient denies having any shortness of breath, nausea, vomiting, dizziness, weakness, hemoptysis.  Patient reported that he took Advil for relief however he did not get any significant relief with use of Advil.     Workup in the emergency room included labs which were essentially within normal limits.  CT chest chest x-ray completed with results as shown below.  EKG shows sinus tachycardia at a rate of 102 bpm.  While in the emergency room patient received Toradol 15 mg IV, ketamine 23 mg IV, Versed 2 mg IV.  Patient had chest tube inserted while in the emergency room.     Patient is being admitted on inpatient status stepdown 1 level care for further workup and management of mammillary spontaneous pneumothorax, chest pain      Condition at Discharge: stable     Discharge Day Visit / Exam:     Subjective: Patient seen and examined at bedside this morning. No acute events overnight. Reports chest pain mostly resolved, well-controlled with current regimen. Feels ready for discharge. Denies nausea, vomiting, headache, abdominal pain, and constipation.    Vitals: Blood Pressure: 142/92 (05/05/24 0328)  Pulse: 81  "(05/05/24 0328)  Temperature: (!) 97.4 °F (36.3 °C) (05/05/24 0704)  Temp Source: Temporal (05/05/24 0704)  Respirations: (!) 35 (05/05/24 0328)  Height: 6' 2\" (188 cm) (05/02/24 1905)  Weight - Scale: 86.9 kg (191 lb 9.3 oz) (05/02/24 1905)  SpO2: 97 % (05/05/24 0328)    Exam:   Physical Exam  Constitutional:       General: He is not in acute distress.     Appearance: Normal appearance.   HENT:      Head: Normocephalic and atraumatic.   Cardiovascular:      Rate and Rhythm: Normal rate and regular rhythm.      Pulses: Normal pulses.      Heart sounds: Normal heart sounds.   Pulmonary:      Effort: Pulmonary effort is normal.      Breath sounds: Normal breath sounds.   Abdominal:      Tenderness: There is no abdominal tenderness. There is no guarding.   Musculoskeletal:      Cervical back: Normal range of motion and neck supple.   Skin:     General: Skin is warm and dry.   Neurological:      General: No focal deficit present.      Mental Status: He is alert and oriented to person, place, and time.         Discharge instructions/Information to patient and family:   See after visit summary for information provided to patient and family.      Provisions for Follow-Up Care:  See after visit summary for information related to follow-up care and any pertinent home health orders.      Disposition:     Home      Planned Readmission: no     Discharge Statement:  I spent 90 minutes discharging the patient. This time was spent on the day of discharge. I had direct contact with the patient on the day of discharge. Greater than 50% of the total time was spent examining patient, answering all patient questions, arranging and discussing plan of care with patient as well as directly providing post-discharge instructions.  Additional time then spent on discharge activities.    Discharge Medications:  See after visit summary for reconciled discharge medications provided to patient and family.      ** Please Note: This note has been " constructed using a voice recognition system **

## 2024-05-05 NOTE — OCCUPATIONAL THERAPY NOTE
"    Occupational Therapy Evaluation     Patient Name: Marcus Barrera  Today's Date: 5/5/2024  Problem List  Active Problems:    Tobacco abuse    Chronic obstructive pulmonary disease (HCC)    Past Medical History  Past Medical History:   Diagnosis Date    Collapsed lung 8 years ago    chest tube placement    Collapsed lung     Renal disorder      Past Surgical History  History reviewed. No pertinent surgical history.          05/05/24 1045   OT Last Visit   OT Visit Date 05/05/24   Note Type   Note type Evaluation   Pain Assessment   Pain Score No Pain   Restrictions/Precautions   Weight Bearing Precautions Per Order No   Home Living   Type of Home Apartment   Home Layout One level;Performs ADLs on one level;Able to live on main level with bedroom/bathroom;Stairs to enter with rails;Other (Comment)  (x2 FOSTE)   Bathroom Shower/Tub Tub/shower unit   Bathroom Toilet Standard   Bathroom Accessibility Accessible   Home Equipment Other (Comment)  (no DME)   Additional Comments pt with no device during functional mobility at baseline   Prior Function   Level of [place] Independent with ADLs;Independent with functional mobility;Independent with IADLS   Lives With Significant other;Family   IADLs Independent with driving   Falls in the last 6 months 0   Vocational Full time employment   Subjective   Subjective \"I named my kids after some [employer] players\"   ADL   Where Assessed Edge of bed   LB Dressing Assistance 7  Independent   LB Dressing Deficit Other (Comment)  (dons shoes and socks at EOB with (I) level)   Additional Comments pt with no pain at chest tube sight   Bed Mobility   Additional Comments pt OOB at start and end of session; SpO2 WFL on RA with no complaints of SOB   Transfers   Sit to Stand 7  Independent   Stand to Sit 7  Independent   Additional Comments pt with no device utilized during functional mobility; no significant LOB or instability   Functional Mobility   Functional Mobility 7  Independent "   Additional Comments pt performs ~450ft with no device; no significant LOB or instability   Additional items   (no device)   Balance   Static Sitting Good   Dynamic Sitting Good   Static Standing Good   Dynamic Standing Good   Ambulatory Good   Activity Tolerance   Activity Tolerance Patient tolerated treatment well   RUE Assessment   RUE Assessment WFL   LUE Assessment   LUE Assessment WFL   Hand Function   Gross Motor Coordination Functional   Fine Motor Coordination Functional   Sensation   Light Touch No apparent deficits   Sharp/Dull No apparent deficits   Psychosocial   Psychosocial (WDL) WDL   Cognition   Overall Cognitive Status WFL   Arousal/Participation Alert   Attention Within functional limits   Orientation Level Oriented X4   Memory Within functional limits   Following Commands Follows all commands and directions without difficulty   Assessment   Assessment Patient is a 44 y.o. [facility] due to Primary spontaneous pneumothorax. Pt performed at (I) level with no OT needs identified at this time Comorbidities affecting pt's physical performance at time of assessment include  tobacco abuse, COPD, chest tube .  The patient's raw score on the -PAC Daily Activity Inpatient Short Form is 24. A raw score of greater than or equal to 19 suggests the patient may benefit from discharge to home. Please refer to the recommendation of the Occupational Therapist for safe discharge planning.  From OT standpoint recommend anticipate no needs upon D/C. No further acute OT needs identified at this time. Recommend continued mobilization with hospital staff and restorative services while in the hospital to increase pt’s endurance and strength upon D/C. From OT standpoint, recommend D/C to home with family support when medically cleared. D/C pt from OT caseload at this time. Pt benefited from co-evaluation of skilled OT and PT therapists in order to most appropriately address  functional deficits d/t extensive assistance required for safe functional mobility, decreased activity tolerance, and regression from functioning level prior to admission and/or onset of present illness. OT/PT objectives were addressed separately; please see PT note for specific goal areas targeted.   Goals   Patient Goals to go home   Discharge Recommendation   Rehab Resource Intensity Level, OT No post-acute rehabilitation needs   AM-PAC Daily Activity Inpatient   Lower Body Dressing 4   Bathing 4   Toileting 4   Upper Body Dressing 4   Grooming 4   Eating 4   Daily Activity Raw Score 24   Daily Activity Standardized Score (Calc for Raw Score >=11) 57.54   AM-PAC Applied Cognition Inpatient   Following a Speech/Presentation 4   Understanding Ordinary Conversation 4   Taking Medications 4   Remembering Where Things Are Placed or Put Away 4   Remembering List of 4-5 Errands 4   Taking Care of Complicated Tasks 4   Applied Cognition Raw Score 24   Applied Cognition Standardized Score 62.21

## 2024-05-05 NOTE — PLAN OF CARE
Problem: PAIN - ADULT  Goal: Verbalizes/displays adequate comfort level or baseline comfort level  Description: Interventions:  - Encourage patient to monitor pain and request assistance  - Assess pain using appropriate pain scale  - Administer analgesics based on type and severity of pain and evaluate response  - Implement non-pharmacological measures as appropriate and evaluate response  - Consider cultural and social influences on pain and pain management  - Notify physician/advanced practitioner if interventions unsuccessful or patient reports new pain  Outcome: Progressing     Problem: INFECTION - ADULT  Goal: Absence or prevention of progression during hospitalization  Description: INTERVENTIONS:  - Assess and monitor for signs and symptoms of infection  - Monitor lab/diagnostic results  - Monitor all insertion sites, i.e. indwelling lines, tubes, and drains  - Monitor endotracheal if appropriate and nasal secretions for changes in amount and color  - [place] appropriate cooling/warming therapies per order  - Administer medications as ordered  - Instruct and encourage patient and family to use good hand hygiene technique  - Identify and instruct in appropriate isolation precautions for identified infection/condition  Outcome: Progressing     Problem: SAFETY ADULT  Goal: Patient will remain free of falls  Description: INTERVENTIONS:  - Educate patient/family on patient safety including physical limitations  - Instruct patient to call for assistance with activity   - Consult OT/PT to assist with strengthening/mobility   - Keep Call bell within reach  - Keep bed low and locked with side rails adjusted as appropriate  - Keep care items and personal belongings within reach  - Initiate and maintain comfort rounds  - Make Fall Risk Sign visible to staff  - Offer Toileting every 2 Hours, in advance of need  - Initiate/Maintain fall alarm as needed  - Obtain necessary fall risk management equipment: nonskid  footwear, call bell  - Apply yellow socks and bracelet for high fall risk patients  - Consider moving patient to room near nurses station  Outcome: Progressing  Goal: Maintain or return to baseline ADL function  Description: INTERVENTIONS:  -  Assess patient's ability to carry out ADLs; assess patient's baseline for ADL function and identify physical deficits which impact ability to perform ADLs (bathing, care of mouth/teeth, toileting, grooming, dressing, etc.)  - Assess/evaluate cause of self-care deficits   - Assess range of motion  - Assess patient's mobility; develop plan if impaired  - Assess patient's need for assistive devices and provide as appropriate  - Encourage maximum independence but intervene and supervise when necessary  - Involve family in performance of ADLs  - Assess for home care needs following discharge   - Consider OT consult to assist with ADL evaluation and planning for discharge  - Provide patient education as appropriate  Outcome: Progressing  Goal: Maintains/Returns to pre admission functional level  Description: INTERVENTIONS:  - Perform AM-PAC 6 Click Basic Mobility/ Daily Activity assessment daily.  - Set and communicate daily mobility goal to care team and patient/family/caregiver.   - Collaborate with rehabilitation services on mobility goals if consulted  - Perform Range of Motion 4 times a day.  - Reposition patient every 2 hours.  - Dangle patient 3 times a day  - Stand patient 3 times a day  - Ambulate patient 3 times a day  - Out of bed to chair 3 times a day   - Out of bed for meals 3 times a day  - Out of bed for toileting  - Record patient progress and toleration of activity level   Outcome: Progressing     Problem: DISCHARGE PLANNING  Goal: Discharge to home or other facility with appropriate resources  Description: INTERVENTIONS:  - Identify barriers to discharge w/patient and caregiver  - Arrange for needed discharge resources and transportation as appropriate  - Identify  discharge learning needs (meds, wound care, etc.)  - Arrange for interpretive services to assist at discharge as needed  - Refer to Case Management Department for coordinating discharge planning if the patient needs post-hospital services based on physician/advanced practitioner order or complex needs related to functional status, cognitive ability, or social support system  Outcome: Progressing     Problem: Knowledge Deficit  Goal: Patient/family/caregiver demonstrates understanding of disease process, treatment plan, medications, and discharge instructions  Description: Complete learning assessment and assess knowledge base.  Interventions:  - Provide teaching at level of understanding  - Provide teaching via preferred learning methods  Outcome: Progressing     Problem: RESPIRATORY - ADULT  Goal: Achieves optimal ventilation and oxygenation  Description: INTERVENTIONS:  - Assess for changes in respiratory status  - Assess for changes in mentation and behavior  - Position to facilitate oxygenation and minimize respiratory effort  - Oxygen administered by appropriate delivery if ordered  - Initiate smoking cessation education as indicated  - Encourage broncho-pulmonary hygiene including cough, deep breathe, Incentive Spirometry  - Assess the need for suctioning and aspirate as needed  - Assess and instruct to report SOB or any respiratory difficulty  - Respiratory Therapy support as indicated  Outcome: Progressing     Problem: SKIN/TISSUE INTEGRITY - ADULT  Goal: Incision(s), wounds(s) or drain site(s) healing without S/S of infection  Description: INTERVENTIONS  - Assess and document dressing, incision, wound bed, drain sites and surrounding tissue  - Provide patient and family education  - Perform skin care/dressing changes every day or as ordered  Outcome: Progressing

## 2024-05-05 NOTE — ASSESSMENT & PLAN NOTE
Smoking cessation   Outpatient referral to FM to establish care  Outpatient referral to pulmonology

## 2024-05-05 NOTE — PHYSICAL THERAPY NOTE
Physical Therapy Evaluation    Patient Name: Marcus Barrera    Today's Date: 5/5/2024     Problem List  Active Problems:    Tobacco abuse    Chronic obstructive pulmonary disease (HCC)       Past Medical History  Past Medical History:   Diagnosis Date    Collapsed lung 8 years ago    chest tube placement    Collapsed lung     Renal disorder         Past Surgical History  History reviewed. No pertinent surgical history.        05/05/24 1035   PT Last Visit   PT Visit Date 05/05/24   Note Type   Note type Evaluation   Pain Assessment   Pain Assessment Tool 0-10   Pain Score No Pain   Restrictions/Precautions   Weight Bearing Precautions Per Order No   Home Living   Type of Home Apartment   Home Layout One level;Stairs to enter with rails  (2 FTE c HR)   Bathroom Shower/Tub Tub/shower unit   Bathroom Toilet Standard   Bathroom Accessibility Accessible   Additional Comments pt denies use of DME at baseline   Prior Function   Level of [place] Independent with ADLs;Independent with functional mobility;Independent with IADLS   Lives With Significant other;Family   IADLs Independent with driving   Falls in the last 6 months 0   Vocational Full time employment   General   Family/Caregiver Present No   Cognition   Overall Cognitive Status WFL   Arousal/Participation Alert   Orientation Level Oriented X4   Following Commands Follows all commands and directions without difficulty   Subjective   Subjective pt pleasant and cooperative; agreeable to ambulation   RLE Assessment   RLE Assessment WFL   LLE Assessment   LLE Assessment WFL   Bed Mobility   Additional Comments pt OOB at start/end of session   Transfers   Sit to Stand 7  Independent   Stand to Sit 7  Independent   Additional Comments no device used   Ambulation/Elevation   Gait pattern WNL   Gait Assistance 7  Independent   Assistive Device None   Distance 450'   Balance   Static Sitting Good   Dynamic Sitting  Good   Static Standing Good   Dynamic Standing Good   Ambulatory Good   Endurance Deficit   Endurance Deficit No   Activity Tolerance   Activity Tolerance Patient tolerated treatment well   Assessment   Prognosis Good   Assessment Patient is a 44 y.o. male evaluated by Physical Therapy s/p admit to [facility] on 5/2/2024 with admitting diagnosis of: Chest pain, Pneumothorax on right, and principal problem of: Primary spontaneous pneumothorax. PT was consulted to assess patient's functional mobility and discharge needs. Ordered are PT Evaluation and treatment with activity level of: activity as tolerated. Comorbidities affecting patient's physical performance at time of assessment include: COPD. Personal factors affecting the patient at time of IE include: step(s) to enter home and tobacco use. Please locate objective findings from PT assessment regarding body systems outlined above. Upon evaluation, pt able to perform all functional mobility independently without assistive device. Pt demonstrating WFL strength, balance, and endurance; able to ambulate 450' without difficulty. Pt denies pain or SOB with mobility. Continued PT intervention not indicated at this time; will d/c PT orders. The patient's AM-PAC Basic Mobility Inpatient Short Form Raw Score is 24. A Raw score of greater than 16 suggests the patient may benefit from discharge to home. Please also refer to the recommendation of the Physical Therapist for safe discharge planning. Co treatment with OT secondary to complex medical condition of pt, possible A of 2 required to achieve and maintain transitional movements, requiring the need of skilled therapeutic intervention of 2 therapists to achieve delivery of services.   Goals   Patient Goals to go home   Plan   PT Frequency Other (Comment)  (eval only)   Discharge Recommendation   Rehab Resource Intensity Level, PT No post-acute rehabilitation needs   AM-PAC Basic Mobility Inpatient   Turning  in Flat Bed Without Bedrails 4   Lying on Back to Sitting on Edge of Flat Bed Without Bedrails 4   Moving Bed to Chair 4   Standing Up From Chair Using Arms 4   Walk in Room 4   Climb 3-5 Stairs With Railing 4   Basic Mobility Inpatient Raw Score 24   Basic Mobility Standardized Score 57.68   [facility] Highest Level Of Mobility   -[facility] Goal 8: Walk 250 feet or more   -HLM Achieved 8: Walk 250 feet ot more   End of Consult   Patient Position at End of Consult Seated edge of bed;All needs within reach

## 2025-03-04 ENCOUNTER — APPOINTMENT (OUTPATIENT)
Dept: URGENT CARE | Facility: MEDICAL CENTER | Age: 45
End: 2025-03-04

## 2025-04-14 ENCOUNTER — APPOINTMENT (OUTPATIENT)
Dept: URGENT CARE | Facility: MEDICAL CENTER | Age: 45
End: 2025-04-14